# Patient Record
Sex: FEMALE | Race: OTHER | NOT HISPANIC OR LATINO | URBAN - METROPOLITAN AREA
[De-identification: names, ages, dates, MRNs, and addresses within clinical notes are randomized per-mention and may not be internally consistent; named-entity substitution may affect disease eponyms.]

---

## 2022-04-27 ENCOUNTER — EMERGENCY (EMERGENCY)
Facility: HOSPITAL | Age: 72
LOS: 1 days | Discharge: HOME | End: 2022-04-27
Attending: EMERGENCY MEDICINE | Admitting: EMERGENCY MEDICINE
Payer: MEDICARE

## 2022-04-27 VITALS
TEMPERATURE: 98 F | HEART RATE: 82 BPM | OXYGEN SATURATION: 98 % | DIASTOLIC BLOOD PRESSURE: 74 MMHG | HEIGHT: 65 IN | RESPIRATION RATE: 15 BRPM | SYSTOLIC BLOOD PRESSURE: 113 MMHG | WEIGHT: 130.07 LBS

## 2022-04-27 VITALS
RESPIRATION RATE: 16 BRPM | DIASTOLIC BLOOD PRESSURE: 72 MMHG | SYSTOLIC BLOOD PRESSURE: 120 MMHG | TEMPERATURE: 98 F | HEART RATE: 86 BPM | OXYGEN SATURATION: 98 %

## 2022-04-27 DIAGNOSIS — R10.9 UNSPECIFIED ABDOMINAL PAIN: ICD-10-CM

## 2022-04-27 DIAGNOSIS — N12 TUBULO-INTERSTITIAL NEPHRITIS, NOT SPECIFIED AS ACUTE OR CHRONIC: ICD-10-CM

## 2022-04-27 LAB
ALBUMIN SERPL ELPH-MCNC: 2.9 G/DL — LOW (ref 3.4–5)
ALBUMIN SERPL ELPH-MCNC: 3.2 G/DL — LOW (ref 3.4–5)
ALP SERPL-CCNC: 74 U/L — SIGNIFICANT CHANGE UP (ref 40–120)
ALP SERPL-CCNC: 76 U/L — SIGNIFICANT CHANGE UP (ref 40–120)
ALT FLD-CCNC: 47 U/L — HIGH (ref 12–42)
ALT FLD-CCNC: 49 U/L — HIGH (ref 12–42)
ANION GAP SERPL CALC-SCNC: 6 MMOL/L — LOW (ref 9–16)
ANION GAP SERPL CALC-SCNC: 9 MMOL/L — SIGNIFICANT CHANGE UP (ref 9–16)
APPEARANCE UR: CLEAR — SIGNIFICANT CHANGE UP
AST SERPL-CCNC: 40 U/L — HIGH (ref 15–37)
AST SERPL-CCNC: 58 U/L — HIGH (ref 15–37)
BACTERIA # UR AUTO: PRESENT /HPF
BASOPHILS # BLD AUTO: 0.05 K/UL — SIGNIFICANT CHANGE UP (ref 0–0.2)
BASOPHILS # BLD AUTO: 0.06 K/UL — SIGNIFICANT CHANGE UP (ref 0–0.2)
BASOPHILS NFR BLD AUTO: 0.4 % — SIGNIFICANT CHANGE UP (ref 0–2)
BASOPHILS NFR BLD AUTO: 0.4 % — SIGNIFICANT CHANGE UP (ref 0–2)
BILIRUB SERPL-MCNC: 1.2 MG/DL — SIGNIFICANT CHANGE UP (ref 0.2–1.2)
BILIRUB SERPL-MCNC: 1.4 MG/DL — HIGH (ref 0.2–1.2)
BILIRUB UR-MCNC: NEGATIVE — SIGNIFICANT CHANGE UP
BUN SERPL-MCNC: 20 MG/DL — SIGNIFICANT CHANGE UP (ref 7–23)
BUN SERPL-MCNC: 21 MG/DL — SIGNIFICANT CHANGE UP (ref 7–23)
CALCIUM SERPL-MCNC: 9.3 MG/DL — SIGNIFICANT CHANGE UP (ref 8.5–10.5)
CALCIUM SERPL-MCNC: 9.6 MG/DL — SIGNIFICANT CHANGE UP (ref 8.5–10.5)
CHLORIDE SERPL-SCNC: 103 MMOL/L — SIGNIFICANT CHANGE UP (ref 96–108)
CHLORIDE SERPL-SCNC: 104 MMOL/L — SIGNIFICANT CHANGE UP (ref 96–108)
CO2 SERPL-SCNC: 27 MMOL/L — SIGNIFICANT CHANGE UP (ref 22–31)
CO2 SERPL-SCNC: 28 MMOL/L — SIGNIFICANT CHANGE UP (ref 22–31)
COLOR SPEC: YELLOW — SIGNIFICANT CHANGE UP
CREAT SERPL-MCNC: 0.77 MG/DL — SIGNIFICANT CHANGE UP (ref 0.5–1.3)
CREAT SERPL-MCNC: 0.9 MG/DL — SIGNIFICANT CHANGE UP (ref 0.5–1.3)
DIFF PNL FLD: ABNORMAL
EGFR: 67 ML/MIN/1.73M2 — SIGNIFICANT CHANGE UP
EGFR: 81 ML/MIN/1.73M2 — SIGNIFICANT CHANGE UP
EOSINOPHIL # BLD AUTO: 0.02 K/UL — SIGNIFICANT CHANGE UP (ref 0–0.5)
EOSINOPHIL # BLD AUTO: 0.03 K/UL — SIGNIFICANT CHANGE UP (ref 0–0.5)
EOSINOPHIL NFR BLD AUTO: 0.1 % — SIGNIFICANT CHANGE UP (ref 0–6)
EOSINOPHIL NFR BLD AUTO: 0.2 % — SIGNIFICANT CHANGE UP (ref 0–6)
EPI CELLS # UR: ABNORMAL /HPF (ref 0–5)
GLUCOSE SERPL-MCNC: 115 MG/DL — HIGH (ref 70–99)
GLUCOSE SERPL-MCNC: 121 MG/DL — HIGH (ref 70–99)
GLUCOSE UR QL: NEGATIVE — SIGNIFICANT CHANGE UP
HCT VFR BLD CALC: 38 % — SIGNIFICANT CHANGE UP (ref 34.5–45)
HCT VFR BLD CALC: 39.4 % — SIGNIFICANT CHANGE UP (ref 34.5–45)
HGB BLD-MCNC: 12.9 G/DL — SIGNIFICANT CHANGE UP (ref 11.5–15.5)
HGB BLD-MCNC: 13.3 G/DL — SIGNIFICANT CHANGE UP (ref 11.5–15.5)
IMM GRANULOCYTES NFR BLD AUTO: 0.4 % — SIGNIFICANT CHANGE UP (ref 0–1.5)
IMM GRANULOCYTES NFR BLD AUTO: 0.4 % — SIGNIFICANT CHANGE UP (ref 0–1.5)
KETONES UR-MCNC: 15 MG/DL
LEUKOCYTE ESTERASE UR-ACNC: NEGATIVE — SIGNIFICANT CHANGE UP
LIDOCAIN IGE QN: 136 U/L — SIGNIFICANT CHANGE UP (ref 73–393)
LYMPHOCYTES # BLD AUTO: 1.06 K/UL — SIGNIFICANT CHANGE UP (ref 1–3.3)
LYMPHOCYTES # BLD AUTO: 1.28 K/UL — SIGNIFICANT CHANGE UP (ref 1–3.3)
LYMPHOCYTES # BLD AUTO: 7.7 % — LOW (ref 13–44)
LYMPHOCYTES # BLD AUTO: 9 % — LOW (ref 13–44)
MANUAL SMEAR VERIFICATION: SIGNIFICANT CHANGE UP
MCHC RBC-ENTMCNC: 31.5 PG — SIGNIFICANT CHANGE UP (ref 27–34)
MCHC RBC-ENTMCNC: 31.5 PG — SIGNIFICANT CHANGE UP (ref 27–34)
MCHC RBC-ENTMCNC: 33.8 GM/DL — SIGNIFICANT CHANGE UP (ref 32–36)
MCHC RBC-ENTMCNC: 33.9 GM/DL — SIGNIFICANT CHANGE UP (ref 32–36)
MCV RBC AUTO: 92.7 FL — SIGNIFICANT CHANGE UP (ref 80–100)
MCV RBC AUTO: 93.4 FL — SIGNIFICANT CHANGE UP (ref 80–100)
MONOCYTES # BLD AUTO: 2.15 K/UL — HIGH (ref 0–0.9)
MONOCYTES # BLD AUTO: 2.23 K/UL — HIGH (ref 0–0.9)
MONOCYTES NFR BLD AUTO: 15.6 % — HIGH (ref 2–14)
MONOCYTES NFR BLD AUTO: 15.7 % — HIGH (ref 2–14)
NEUTROPHILS # BLD AUTO: 10.38 K/UL — HIGH (ref 1.8–7.4)
NEUTROPHILS # BLD AUTO: 10.61 K/UL — HIGH (ref 1.8–7.4)
NEUTROPHILS NFR BLD AUTO: 74.4 % — SIGNIFICANT CHANGE UP (ref 43–77)
NEUTROPHILS NFR BLD AUTO: 75.7 % — SIGNIFICANT CHANGE UP (ref 43–77)
NITRITE UR-MCNC: POSITIVE
NRBC # BLD: 0 /100 WBCS — SIGNIFICANT CHANGE UP (ref 0–0)
NRBC # BLD: 0 /100 WBCS — SIGNIFICANT CHANGE UP (ref 0–0)
PH UR: 6 — SIGNIFICANT CHANGE UP (ref 5–8)
PLAT MORPH BLD: NORMAL — SIGNIFICANT CHANGE UP
PLATELET # BLD AUTO: 170 K/UL — SIGNIFICANT CHANGE UP (ref 150–400)
PLATELET # BLD AUTO: 193 K/UL — SIGNIFICANT CHANGE UP (ref 150–400)
POTASSIUM SERPL-MCNC: 4.5 MMOL/L — SIGNIFICANT CHANGE UP (ref 3.5–5.3)
POTASSIUM SERPL-MCNC: SIGNIFICANT CHANGE UP MMOL/L (ref 3.5–5.3)
POTASSIUM SERPL-SCNC: 4.5 MMOL/L — SIGNIFICANT CHANGE UP (ref 3.5–5.3)
POTASSIUM SERPL-SCNC: SIGNIFICANT CHANGE UP MMOL/L (ref 3.5–5.3)
PROT SERPL-MCNC: 8.2 G/DL — SIGNIFICANT CHANGE UP (ref 6.4–8.2)
PROT SERPL-MCNC: 8.4 G/DL — HIGH (ref 6.4–8.2)
PROT UR-MCNC: ABNORMAL MG/DL
RBC # BLD: 4.1 M/UL — SIGNIFICANT CHANGE UP (ref 3.8–5.2)
RBC # BLD: 4.22 M/UL — SIGNIFICANT CHANGE UP (ref 3.8–5.2)
RBC # FLD: 13.7 % — SIGNIFICANT CHANGE UP (ref 10.3–14.5)
RBC # FLD: 14 % — SIGNIFICANT CHANGE UP (ref 10.3–14.5)
RBC BLD AUTO: NORMAL — SIGNIFICANT CHANGE UP
RBC CASTS # UR COMP ASSIST: ABNORMAL /HPF
SODIUM SERPL-SCNC: 137 MMOL/L — SIGNIFICANT CHANGE UP (ref 132–145)
SODIUM SERPL-SCNC: 140 MMOL/L — SIGNIFICANT CHANGE UP (ref 132–145)
SP GR SPEC: <=1.005 — SIGNIFICANT CHANGE UP (ref 1–1.03)
UROBILINOGEN FLD QL: 2 E.U./DL
WBC # BLD: 13.72 K/UL — HIGH (ref 3.8–10.5)
WBC # BLD: 14.27 K/UL — HIGH (ref 3.8–10.5)
WBC # FLD AUTO: 13.72 K/UL — HIGH (ref 3.8–10.5)
WBC # FLD AUTO: 14.27 K/UL — HIGH (ref 3.8–10.5)
WBC UR QL: < 5 /HPF — SIGNIFICANT CHANGE UP

## 2022-04-27 PROCEDURE — 99285 EMERGENCY DEPT VISIT HI MDM: CPT

## 2022-04-27 PROCEDURE — 76705 ECHO EXAM OF ABDOMEN: CPT | Mod: 26

## 2022-04-27 PROCEDURE — 74177 CT ABD & PELVIS W/CONTRAST: CPT | Mod: 26

## 2022-04-27 RX ORDER — ONDANSETRON 8 MG/1
4 TABLET, FILM COATED ORAL ONCE
Refills: 0 | Status: COMPLETED | OUTPATIENT
Start: 2022-04-27 | End: 2022-04-27

## 2022-04-27 RX ORDER — CEFPODOXIME PROXETIL 100 MG
1 TABLET ORAL
Qty: 28 | Refills: 0
Start: 2022-04-27 | End: 2022-05-10

## 2022-04-27 RX ORDER — CEFUROXIME AXETIL 250 MG
1 TABLET ORAL
Qty: 28 | Refills: 0
Start: 2022-04-27 | End: 2022-05-10

## 2022-04-27 RX ORDER — OXYCODONE AND ACETAMINOPHEN 5; 325 MG/1; MG/1
2 TABLET ORAL ONCE
Refills: 0 | Status: DISCONTINUED | OUTPATIENT
Start: 2022-04-27 | End: 2022-04-27

## 2022-04-27 RX ORDER — CEFPODOXIME PROXETIL 100 MG
1 TABLET ORAL
Qty: 28 | Refills: 0
Start: 2022-04-27 | End: 2023-09-25

## 2022-04-27 RX ORDER — SODIUM CHLORIDE 9 MG/ML
1000 INJECTION INTRAMUSCULAR; INTRAVENOUS; SUBCUTANEOUS ONCE
Refills: 0 | Status: COMPLETED | OUTPATIENT
Start: 2022-04-27 | End: 2022-04-27

## 2022-04-27 RX ORDER — MORPHINE SULFATE 50 MG/1
4 CAPSULE, EXTENDED RELEASE ORAL ONCE
Refills: 0 | Status: DISCONTINUED | OUTPATIENT
Start: 2022-04-27 | End: 2022-04-27

## 2022-04-27 RX ORDER — CEFTRIAXONE 500 MG/1
1000 INJECTION, POWDER, FOR SOLUTION INTRAMUSCULAR; INTRAVENOUS ONCE
Refills: 0 | Status: COMPLETED | OUTPATIENT
Start: 2022-04-27 | End: 2022-04-27

## 2022-04-27 RX ADMIN — ONDANSETRON 4 MILLIGRAM(S): 8 TABLET, FILM COATED ORAL at 14:28

## 2022-04-27 RX ADMIN — OXYCODONE AND ACETAMINOPHEN 2 TABLET(S): 5; 325 TABLET ORAL at 20:24

## 2022-04-27 RX ADMIN — CEFTRIAXONE 100 MILLIGRAM(S): 500 INJECTION, POWDER, FOR SOLUTION INTRAMUSCULAR; INTRAVENOUS at 18:55

## 2022-04-27 RX ADMIN — SODIUM CHLORIDE 1000 MILLILITER(S): 9 INJECTION INTRAMUSCULAR; INTRAVENOUS; SUBCUTANEOUS at 14:28

## 2022-04-27 RX ADMIN — MORPHINE SULFATE 4 MILLIGRAM(S): 50 CAPSULE, EXTENDED RELEASE ORAL at 14:28

## 2022-04-27 NOTE — ED PROVIDER NOTE - CARE PROVIDER_API CALL
Todd Funk (DO)  [address], [facility] [place] [address]  Phone: [phone]  Fax: [phone]  Follow Up Time:

## 2022-04-27 NOTE — ED PROVIDER NOTE - NSFOLLOWUPINSTRUCTIONS_ED_ALL_ED_FT
Kidney Infection    WHAT YOU NEED TO KNOW:    A kidney infection, or pyelonephritis, is a bacterial infection. The infection usually starts in your bladder or urethra and moves into your kidney. One or both kidneys may be infected. Kidney, Ureters, Bladder         DISCHARGE INSTRUCTIONS:    Return to the emergency department if:     You have a fever and chills.       You cannot stop vomiting.      You have severe pain in your abdomen, lower back, or sides.    Contact your healthcare provider if:     You continue to have a fever after you take antibiotics for 3 days.      You have pain when you urinate, even after treatment.      Your signs and symptoms return.      You have questions or concerns about your condition or care.    Medicines: You may need any of the following:     Antibiotics treat your bacterial infection.       Acetaminophen decreases pain and fever. It is available without a doctor's order. Ask how much to take and how often to take it. Follow directions. Read the labels of all other medicines you are using to see if they also contain acetaminophen, or ask your doctor or pharmacist. Acetaminophen can cause liver damage if not taken correctly. Do not use more than 4 grams (4,000 milligrams) total of acetaminophen in one day.       NSAIDs, such as ibuprofen, help decrease swelling, pain, and fever. This medicine is available with or without a doctor's order. NSAIDs can cause stomach bleeding or kidney problems in certain people. If you take blood thinner medicine, always ask if NSAIDs are safe for you. Always read the medicine label and follow directions. Do not give these medicines to children under 6 months of age without direction from your child's healthcare provider.      Prescription pain medicine may be given. Ask how to take this medicine safely.      Take your medicine as directed. Contact your healthcare provider if you think your medicine is not helping or if you have side effects. Tell him of her if you are allergic to any medicine. Keep a list of the medicines, vitamins, and herbs you take. Include the amounts, and when and why you take them. Bring the list or the pill bottles to follow-up visits. Carry your medicine list with you in case of an emergency.    Drink liquids as directed: You may need to drink extra liquids to help flush your kidneys and urinary system. Water is the best liquid to drink. Ask your healthcare provider how much liquid to drink each day and which liquids are best for you.    Urinate as soon as you feel the urge: This will help flush bacteria from your urinary system. Do not wait or hold your urine for too long.     Clean your genital area every day with soap and water: Wipe from front to back after you urinate or have a bowel movement. Wear cotton underwear. Fabrics such as nylon and polyester can stay damp. This can increase your risk for infection. Urinate within 15 minutes after you have sex.    Follow up with your healthcare provider as directed: Write down your questions so you remember to ask them during your visits.

## 2022-04-27 NOTE — ED PROVIDER NOTE - PHYSICAL EXAMINATION
VSS in NAD non toxic appearing   NCAT EOMI PERRL OP clear  heart RRR no murmur   lungs CTA no wheezing no rales no rhonchi   abd soft (+) L abd/epigastric tenderness ND no guarding no rebound   normal neuro exam CN I-XII grossly intact, no groos motor or sensory deficits  no peripheral c/c/e

## 2022-04-27 NOTE — ED ADULT NURSE NOTE - NSIMPLEMENTINTERV_GEN_ALL_ED
Implemented All Universal Safety Interventions:  [place] to call system. Call bell, personal items and telephone within reach. Instruct patient to call for assistance. Room bathroom lighting operational. Non-slip footwear when patient is off stretcher. Physically safe environment: no spills, clutter or unnecessary equipment. Stretcher in lowest position, wheels locked, appropriate side rails in place.

## 2022-04-27 NOTE — ED PROVIDER NOTE - CLINICAL SUMMARY MEDICAL DECISION MAKING FREE TEXT BOX
pyelonephritis on CT, IV abx started in ED< toelrated PO, pain controlled, stable for dc home pyelonephritis on CT, IV abx started in ED, tolerated PO, pain controlled, stable for dc home

## 2022-04-27 NOTE — ED ADULT TRIAGE NOTE - CHIEF COMPLAINT QUOTE
patient BIBA from home c/o flu-like s/s and left side abd pain since Sunday; body aches, chills today; covid positive april 1st and just had pfizer booster april 14th;

## 2022-04-27 NOTE — ED PROVIDER NOTE - OBJECTIVE STATEMENT
73 yo female with c/o URI sx for a few days, L sided flank pain, constant, dull aching, for 2-3 days, (+) subjective fever, no chills, no vomiting.

## 2022-04-27 NOTE — ED PROVIDER NOTE - PATIENT PORTAL LINK FT
You can access the FollowMyHealth Patient Portal offered by [facility] by registering at the following website: [email or web address]. By joining [employer]’s FollowMyHealth portal, you will also be able to view your health information using other applications (apps) compatible with our system.

## 2022-04-29 LAB
CULTURE RESULTS: SIGNIFICANT CHANGE UP
SPECIMEN SOURCE: SIGNIFICANT CHANGE UP

## 2022-10-28 PROBLEM — Z00.00 ENCOUNTER FOR PREVENTIVE HEALTH EXAMINATION: Status: ACTIVE | Noted: 2022-10-28

## 2022-10-31 ENCOUNTER — RESULT REVIEW (OUTPATIENT)
Age: 72
End: 2022-10-31

## 2022-10-31 ENCOUNTER — APPOINTMENT (OUTPATIENT)
Dept: ORTHOPEDIC SURGERY | Facility: CLINIC | Age: 72
End: 2022-10-31

## 2022-10-31 ENCOUNTER — OUTPATIENT (OUTPATIENT)
Dept: OUTPATIENT SERVICES | Facility: HOSPITAL | Age: 72
LOS: 1 days | End: 2022-10-31
Payer: MEDICARE

## 2022-10-31 VITALS
DIASTOLIC BLOOD PRESSURE: 79 MMHG | SYSTOLIC BLOOD PRESSURE: 136 MMHG | OXYGEN SATURATION: 99 % | HEART RATE: 56 BPM | HEIGHT: 64 IN | TEMPERATURE: 98 F | BODY MASS INDEX: 25.1 KG/M2 | WEIGHT: 147 LBS

## 2022-10-31 DIAGNOSIS — Z60.2 PROBLEMS RELATED TO LIVING ALONE: ICD-10-CM

## 2022-10-31 DIAGNOSIS — Z85.3 PERSONAL HISTORY OF MALIGNANT NEOPLASM OF BREAST: ICD-10-CM

## 2022-10-31 DIAGNOSIS — M16.0 BILATERAL PRIMARY OSTEOARTHRITIS OF HIP: ICD-10-CM

## 2022-10-31 DIAGNOSIS — Z78.9 OTHER SPECIFIED HEALTH STATUS: ICD-10-CM

## 2022-10-31 PROCEDURE — 73502 X-RAY EXAM HIP UNI 2-3 VIEWS: CPT

## 2022-10-31 PROCEDURE — 99203 OFFICE O/P NEW LOW 30 MIN: CPT

## 2022-10-31 PROCEDURE — 73502 X-RAY EXAM HIP UNI 2-3 VIEWS: CPT | Mod: 26,LT

## 2022-10-31 RX ORDER — MELOXICAM 7.5 MG/1
7.5 TABLET ORAL DAILY
Qty: 30 | Refills: 0 | Status: ACTIVE | COMMUNITY
Start: 2022-10-31 | End: 1900-01-01

## 2022-10-31 RX ORDER — EXEMESTANE 25 MG/1
TABLET, FILM COATED ORAL
Refills: 0 | Status: ACTIVE | COMMUNITY

## 2022-10-31 SDOH — SOCIAL STABILITY - SOCIAL INSECURITY: PROBLEMS RELATED TO LIVING ALONE: Z60.2

## 2022-10-31 NOTE — PHYSICAL EXAM
[de-identified] : Exam she is alert and oriented.  She is able to stand on each leg without a positive Trendelenburg.  There is some mild antalgia on the left side.  She has some mild pain posteriorly with range of motion and with ADA testing.  Straight leg testing and contralateral straight leg testing also causes some discomfort abdominally posteriorly but no radiation past the knee.  Ankle dorsiflexion plantarflexion foot eversion foot inversion 5 out of 5.  L3-S1 sensation light touch intact although again she subjectively reports some decrease over the anterior aspect of the knee area.  DP/PT 2+ [de-identified] : Imaging studies reviewed include 3 views of the left hip.  I think there is some mild arthritis but no bone-on-bone apposition.  MRI reports of the femur and hip reveal some degenerative changes in the hip with some mild hamstring and gluteus tendinosis.  In addition MRI studies do show significant degenerative change of the spine by report (we were not able to open the images with this she brought).

## 2022-10-31 NOTE — HISTORY OF PRESENT ILLNESS
[de-identified] : Patient is here for left-sided pain.  She is pain in her low back and buttock area that does radiate laterally sometimes down the leg.  She is noted some numbness over the anterior aspect of her knee at times as well.  Not complete numbness but she feels a subjective decreased sensation.  No specific trauma or injury.  She is try to work with chiropractor.  She takes Tylenol and medical marijuana.  She had multiple MRI studies and is here to and regarding that.  Is making it difficult for her to walk and she finds very bad in the morning.  It does get better throughout the day but it makes work challenging for her.

## 2022-10-31 NOTE — PHYSICAL EXAM
[de-identified] : Exam she is alert and oriented.  She is able to stand on each leg without a positive Trendelenburg.  There is some mild antalgia on the left side.  She has some mild pain posteriorly with range of motion and with ADA testing.  Straight leg testing and contralateral straight leg testing also causes some discomfort abdominally posteriorly but no radiation past the knee.  Ankle dorsiflexion plantarflexion foot eversion foot inversion 5 out of 5.  L3-S1 sensation light touch intact although again she subjectively reports some decrease over the anterior aspect of the knee area.  DP/PT 2+ [de-identified] : Imaging studies reviewed include 3 views of the left hip.  I think there is some mild arthritis but no bone-on-bone apposition.  MRI reports of the femur and hip reveal some degenerative changes in the hip with some mild hamstring and gluteus tendinosis.  In addition MRI studies do show significant degenerative change of the spine by report (we were not able to open the images with this she brought).

## 2022-10-31 NOTE — HISTORY OF PRESENT ILLNESS
[de-identified] : Patient is here for left-sided pain.  She is pain in her low back and buttock area that does radiate laterally sometimes down the leg.  She is noted some numbness over the anterior aspect of her knee at times as well.  Not complete numbness but she feels a subjective decreased sensation.  No specific trauma or injury.  She is try to work with chiropractor.  She takes Tylenol and medical marijuana.  She had multiple MRI studies and is here to and regarding that.  Is making it difficult for her to walk and she finds very bad in the morning.  It does get better throughout the day but it makes work challenging for her.

## 2022-10-31 NOTE — DISCUSSION/SUMMARY
[de-identified] : Left leg and back pain is noted.  Patient does have some mild arthritis and degenerative changes of the hip and we discussed that.  We talked about symptomatic treatments she can use.  We discussed the spectrum of symptomatic treatments. Our discussion included the use of appropriate exercises, activity modifications, weight reduction and maintenance, appropriate medication use, use of assistive devices,and avoidance of high impact activities.  Discussed the risks of GI bleeding and other side effects and decided we will try an anti-inflammatory to add to her medication regimen.  Although I think the hip arthritis may be partially contributing I do not think is the major contributor of her symptoms we discussed that.  I think the spine is more likely because I made a referral and provided contact information for spine evaluation.  She will continue work on nonoperative modalities and will follow-up with is any significant changes or concerns but I think the neck step is a spine evaluation she agrees.  All questions answered.

## 2022-10-31 NOTE — DISCUSSION/SUMMARY
[de-identified] : Left leg and back pain is noted.  Patient does have some mild arthritis and degenerative changes of the hip and we discussed that.  We talked about symptomatic treatments she can use.  We discussed the spectrum of symptomatic treatments. Our discussion included the use of appropriate exercises, activity modifications, weight reduction and maintenance, appropriate medication use, use of assistive devices,and avoidance of high impact activities.  Discussed the risks of GI bleeding and other side effects and decided we will try an anti-inflammatory to add to her medication regimen.  Although I think the hip arthritis may be partially contributing I do not think is the major contributor of her symptoms we discussed that.  I think the spine is more likely because I made a referral and provided contact information for spine evaluation.  She will continue work on nonoperative modalities and will follow-up with is any significant changes or concerns but I think the neck step is a spine evaluation she agrees.  All questions answered.

## 2022-11-01 ENCOUNTER — APPOINTMENT (OUTPATIENT)
Dept: ORTHOPEDIC SURGERY | Facility: CLINIC | Age: 72
End: 2022-11-01

## 2022-11-01 DIAGNOSIS — Z91.89 OTHER SPECIFIED PERSONAL RISK FACTORS, NOT ELSEWHERE CLASSIFIED: ICD-10-CM

## 2022-11-01 PROCEDURE — 72083 X-RAY EXAM ENTIRE SPI 4/5 VW: CPT

## 2022-11-01 PROCEDURE — 99213 OFFICE O/P EST LOW 20 MIN: CPT

## 2022-11-04 NOTE — END OF VISIT
[FreeTextEntry3] : All medical record entries made by the Scribe were at my, Dr. Luke Yepez, direction and personally dictated by me on 11/01/2022 . I have reviewed the chart and agree that the record accurately reflects my personal performance of the history, physical exam, assessment and plan. I have also personally directed, reviewed, and agreed with the chart.

## 2022-11-04 NOTE — HISTORY OF PRESENT ILLNESS
[de-identified] : Patient was referred by Dr. Leal. She reports low back pain since January 2022, atraumatic onset. Pain radiates down her left lower extremity anteriorly to her foot. She states her pain is approximately 50 perfect low back and 50 percent left lower extremity. She also occasionally has pain in the same distribution down her right lower extremity. She denies any lower extremity numbness, weakness, or paresthesias. She is significantly limited by current symptoms. She has used a TENS unit without any relief and she has also done acupuncture without relief.

## 2022-11-04 NOTE — PHYSICAL EXAM
[de-identified] : Physical Exam:\par \par General: patient is well developed, well nourished, in no acute\par distress, alert and oriented x 3.\par \par Mood and affect: normal\par \par Respiratory: no respiratory distress noted\par \par Skin: no scars over spine, skin intact, no erythema, increased warmth\par \par Alignment: The spine is well compensated in the coronal and sagittal plane.\par \par Gait: The patient is able to toe walk and heel walk without difficulty. The patient is able to tandem gait without difficulty.\par \par Palpation: Tender to palpation over her left greater trochanter.\par \par Range of motion: Lumbar spine ROM is restricted.\par \par Neurologic Exam:\par Motor: Manual Muscle testing in the lower extremities is 5 out of 5 in all muscle groups. There is no evidence of muscular\par atrophy in the lower extremities. Sensory: Sensation to light touch is grossly intact in the lower extremities\par \par Reflexes: DTR are present and symmetric throughout, no clonus, plantar responses are flexor\par \par Hip Exam: No pain with internal or external rotation of hips bilaterally\par \par Special tests: Straight leg raise test negative. Cross straight leg test negative. ADA test negative\par \par Vascular: Examination of the peripheral vascular system demonstrates no evidence of congestion or edema. no evidence of\par lymphedema bilateral lower extremities, pulses are present and symmetric in both lower extremities. [de-identified] : XR Full Spine AP/Lateral with Lumbar flexion/extension 11/01/2022 [my read]: Lumbar scoliosis. There is lateral listhesis that is significant at L3/4. There is concavity of her scoliosis with n apex at L2/3 and L3/4 on the left. She has a fractional curve with a concavity on the right at L4/5 and L5/S1. There is multilevel disc degeneration. Her sagittal and coronal global alignment are well maintained. There is no instability seen on flexion/extension. There are no fractures seen.

## 2022-11-04 NOTE — DISCUSSION/SUMMARY
[de-identified] : Diagnosis: Lumbar radiculopathy, lumbar degenerative scoliosis, lateral listhesis L3/4, osteoporosis.\par \par I have discussed my findings with the patient. I was unable to review her MRIs as the report only was available today. Given her symptoms which have been progressing, I would recommend a new MRI Lumbar Spine with and without contrast. Additionally, given her prior history of stress fracture in the right sacrum, I do think that it would be useful to re image it now 7 months later to assess for healing of her stress fracture. I have also ordered a DEXA scan to assess for bone density. Patient will follow up with me after testing at which point we will review and develop a treatment plan.

## 2022-11-14 ENCOUNTER — APPOINTMENT (OUTPATIENT)
Dept: ORTHOPEDIC SURGERY | Facility: CLINIC | Age: 72
End: 2022-11-14
Payer: MEDICARE

## 2022-11-14 PROCEDURE — 99214 OFFICE O/P EST MOD 30 MIN: CPT

## 2022-11-28 ENCOUNTER — APPOINTMENT (OUTPATIENT)
Dept: PHYSICAL MEDICINE AND REHAB | Facility: CLINIC | Age: 72
End: 2022-11-28

## 2022-12-20 NOTE — ED ADULT NURSE NOTE - NS ED NOTE ABUSE RESPONSE YN
No new care gaps identified.  [facility] Embedded Care Gaps. Reference number: [id number]. 12/20/2022   3:55:44 AM CST   Yes

## 2023-01-05 NOTE — PHYSICAL EXAM
[de-identified] : Physical Exam:\par \par General: patient is well developed, well nourished, in no acute\par distress, alert and oriented x 3.\par \par Mood and affect: normal\par \par Respiratory: no respiratory distress noted\par \par Skin: no scars over spine, skin intact, no erythema, increased warmth\par \par Alignment: The spine is well compensated in the coronal and sagittal plane.\par \par Gait: The patient is able to toe walk and heel walk without difficulty. The patient is able to tandem gait without difficulty.\par \par Palpation: Tender to palpation over her left greater trochanter.\par \par Range of motion: Lumbar spine ROM is restricted.\par \par Neurologic Exam:\par Motor: Manual Muscle testing in the lower extremities is 5 out of 5 in all muscle groups. There is no evidence of muscular\par atrophy in the lower extremities. Sensory: Sensation to light touch is grossly intact in the lower extremities\par \par Reflexes: DTR are present and symmetric throughout, no clonus, plantar responses are flexor\par \par Hip Exam: No pain with internal or external rotation of hips bilaterally\par \par Special tests: Straight leg raise test negative. Cross straight leg test negative. ADA test negative\par \par Vascular: Examination of the peripheral vascular system demonstrates no evidence of congestion or edema. no evidence of\par lymphedema bilateral lower extremities, pulses are present and symmetric in both lower extremities. [de-identified] : MRI Lumbar Spine noncontrast reviewed 11/07/2022: Patient has significant rotatory scoliosis. There is a lateral listhesis seen at L3/4. There is severe neuroforaminal stenosis on the left at L2/3 and L3/4. There is lateral recess stenosis at L3/4 on the left. The patient has moderate to severe right sided foraminal stenosis at L4/5 and L5/S1.\par \par XR Full Spine AP/Lateral with Lumbar flexion/extension 11/01/2022 [my read]: Lumbar scoliosis. There is lateral listhesis that is significant at L3/4. There is concavity of her scoliosis with n apex at L2/3 and L3/4 on the left. She has a fractional curve with a concavity on the right at L4/5 and L5/S1. There is multilevel disc degeneration. Her sagittal and coronal global alignment are well maintained. There is no instability seen on flexion/extension. There are no fractures seen.

## 2023-01-05 NOTE — DISCUSSION/SUMMARY
[de-identified] : Diagnosis: Lumbar radiculopathy, rotatory scoliosis, L3/4 lateral spondylolisthesis, spinal stenosis.\par \par I have discussed my findings with the patient. I have recommended a course of physical therapy. I have also given a referral to Dr. Niels Urbina for left sided L3/4 DOMINIC. The patient was given a referral to an endocrinologist to address her bone health and osteoporosis. She should follow up with me in 2 months for follow up and clinical review.

## 2023-01-05 NOTE — END OF VISIT
[FreeTextEntry3] : All medical record entries made by the Scribe were at my, Dr. Luke Yepez, direction and personally dictated by me on 11/14/2022. I have reviewed the chart and agree that the record accurately reflects my personal performance of the history, physical exam, assessment and plan. I have also personally directed, reviewed, and agreed with the chart.

## 2023-01-05 NOTE — HISTORY OF PRESENT ILLNESS
[de-identified] : Follow up 11/14/2022: Patient continues to have pain in her low back that radiates into her left anterior thigh mainly. This has been unchanged since her last visit. In the interim, she got a MRI of her Lumbar Spine.\par \par Initial visit 11/01/2022: Patient was referred by Dr. Leal. She reports low back pain since January 2022, atraumatic onset. Pain radiates down her left lower extremity anteriorly to her foot. She states her pain is approximately 50 perfect low back and 50 percent left lower extremity. She also occasionally has pain in the same distribution down her right lower extremity. She denies any lower extremity numbness, weakness, or paresthesias. She is significantly limited by current symptoms. She has used a TENS unit without any relief and she has also done acupuncture without relief.

## 2023-01-10 ENCOUNTER — APPOINTMENT (OUTPATIENT)
Dept: ORTHOPEDIC SURGERY | Facility: CLINIC | Age: 73
End: 2023-01-10

## 2023-04-06 ENCOUNTER — EMERGENCY (EMERGENCY)
Facility: HOSPITAL | Age: 73
LOS: 1 days | Discharge: HOME | End: 2023-04-06
Admitting: EMERGENCY MEDICINE
Payer: MEDICARE

## 2023-04-06 VITALS
HEART RATE: 63 BPM | WEIGHT: 145.06 LBS | DIASTOLIC BLOOD PRESSURE: 69 MMHG | HEIGHT: 63 IN | SYSTOLIC BLOOD PRESSURE: 161 MMHG | OXYGEN SATURATION: 100 % | TEMPERATURE: 98 F | RESPIRATION RATE: 16 BRPM

## 2023-04-06 DIAGNOSIS — R07.89 OTHER CHEST PAIN: ICD-10-CM

## 2023-04-06 DIAGNOSIS — N64.4 MASTODYNIA: ICD-10-CM

## 2023-04-06 DIAGNOSIS — Z20.822 CONTACT WITH AND (SUSPECTED) EXPOSURE TO COVID-19: ICD-10-CM

## 2023-04-06 DIAGNOSIS — Z85.3 PERSONAL HISTORY OF MALIGNANT NEOPLASM OF BREAST: ICD-10-CM

## 2023-04-06 LAB
ALBUMIN SERPL ELPH-MCNC: 3.7 G/DL — SIGNIFICANT CHANGE UP (ref 3.4–5)
ALP SERPL-CCNC: 53 U/L — SIGNIFICANT CHANGE UP (ref 40–120)
ALT FLD-CCNC: 23 U/L — SIGNIFICANT CHANGE UP (ref 12–42)
ANION GAP SERPL CALC-SCNC: 7 MMOL/L — LOW (ref 9–16)
APPEARANCE UR: CLEAR — SIGNIFICANT CHANGE UP
APTT BLD: 30.8 SEC — SIGNIFICANT CHANGE UP (ref 27.5–35.5)
AST SERPL-CCNC: 24 U/L — SIGNIFICANT CHANGE UP (ref 15–37)
BACTERIA # UR AUTO: PRESENT /HPF
BASOPHILS # BLD AUTO: 0.03 K/UL — SIGNIFICANT CHANGE UP (ref 0–0.2)
BASOPHILS NFR BLD AUTO: 0.6 % — SIGNIFICANT CHANGE UP (ref 0–2)
BILIRUB SERPL-MCNC: 0.4 MG/DL — SIGNIFICANT CHANGE UP (ref 0.2–1.2)
BILIRUB UR-MCNC: NEGATIVE — SIGNIFICANT CHANGE UP
BUN SERPL-MCNC: 13 MG/DL — SIGNIFICANT CHANGE UP (ref 7–23)
CALCIUM SERPL-MCNC: 9.2 MG/DL — SIGNIFICANT CHANGE UP (ref 8.5–10.5)
CHLORIDE SERPL-SCNC: 105 MMOL/L — SIGNIFICANT CHANGE UP (ref 96–108)
CO2 SERPL-SCNC: 28 MMOL/L — SIGNIFICANT CHANGE UP (ref 22–31)
COLOR SPEC: YELLOW — SIGNIFICANT CHANGE UP
COMMENT - URINE: SIGNIFICANT CHANGE UP
CREAT SERPL-MCNC: 0.83 MG/DL — SIGNIFICANT CHANGE UP (ref 0.5–1.3)
DIFF PNL FLD: NEGATIVE — SIGNIFICANT CHANGE UP
EGFR: 73 ML/MIN/1.73M2 — SIGNIFICANT CHANGE UP
EOSINOPHIL # BLD AUTO: 0.16 K/UL — SIGNIFICANT CHANGE UP (ref 0–0.5)
EOSINOPHIL NFR BLD AUTO: 3.3 % — SIGNIFICANT CHANGE UP (ref 0–6)
EPI CELLS # UR: ABNORMAL /HPF (ref 0–5)
GLUCOSE SERPL-MCNC: 92 MG/DL — SIGNIFICANT CHANGE UP (ref 70–99)
GLUCOSE UR QL: NEGATIVE — SIGNIFICANT CHANGE UP
HCT VFR BLD CALC: 38.5 % — SIGNIFICANT CHANGE UP (ref 34.5–45)
HGB BLD-MCNC: 12.9 G/DL — SIGNIFICANT CHANGE UP (ref 11.5–15.5)
IMM GRANULOCYTES NFR BLD AUTO: 0.2 % — SIGNIFICANT CHANGE UP (ref 0–0.9)
INR BLD: 1.1 — SIGNIFICANT CHANGE UP (ref 0.88–1.16)
KETONES UR-MCNC: NEGATIVE — SIGNIFICANT CHANGE UP
LEUKOCYTE ESTERASE UR-ACNC: ABNORMAL
LYMPHOCYTES # BLD AUTO: 1.13 K/UL — SIGNIFICANT CHANGE UP (ref 1–3.3)
LYMPHOCYTES # BLD AUTO: 23 % — SIGNIFICANT CHANGE UP (ref 13–44)
MAGNESIUM SERPL-MCNC: 2 MG/DL — SIGNIFICANT CHANGE UP (ref 1.6–2.6)
MCHC RBC-ENTMCNC: 31.5 PG — SIGNIFICANT CHANGE UP (ref 27–34)
MCHC RBC-ENTMCNC: 33.5 GM/DL — SIGNIFICANT CHANGE UP (ref 32–36)
MCV RBC AUTO: 94.1 FL — SIGNIFICANT CHANGE UP (ref 80–100)
MONOCYTES # BLD AUTO: 0.52 K/UL — SIGNIFICANT CHANGE UP (ref 0–0.9)
MONOCYTES NFR BLD AUTO: 10.6 % — SIGNIFICANT CHANGE UP (ref 2–14)
NEUTROPHILS # BLD AUTO: 3.07 K/UL — SIGNIFICANT CHANGE UP (ref 1.8–7.4)
NEUTROPHILS NFR BLD AUTO: 62.3 % — SIGNIFICANT CHANGE UP (ref 43–77)
NITRITE UR-MCNC: NEGATIVE — SIGNIFICANT CHANGE UP
NRBC # BLD: 0 /100 WBCS — SIGNIFICANT CHANGE UP (ref 0–0)
NT-PROBNP SERPL-SCNC: 172 PG/ML — SIGNIFICANT CHANGE UP
PH UR: 6 — SIGNIFICANT CHANGE UP (ref 5–8)
PLATELET # BLD AUTO: 243 K/UL — SIGNIFICANT CHANGE UP (ref 150–400)
POTASSIUM SERPL-MCNC: 4.1 MMOL/L — SIGNIFICANT CHANGE UP (ref 3.5–5.3)
POTASSIUM SERPL-SCNC: 4.1 MMOL/L — SIGNIFICANT CHANGE UP (ref 3.5–5.3)
PROT SERPL-MCNC: 8.1 G/DL — SIGNIFICANT CHANGE UP (ref 6.4–8.2)
PROT UR-MCNC: NEGATIVE MG/DL — SIGNIFICANT CHANGE UP
PROTHROM AB SERPL-ACNC: 12.9 SEC — SIGNIFICANT CHANGE UP (ref 10.5–13.4)
RBC # BLD: 4.09 M/UL — SIGNIFICANT CHANGE UP (ref 3.8–5.2)
RBC # FLD: 14 % — SIGNIFICANT CHANGE UP (ref 10.3–14.5)
RBC CASTS # UR COMP ASSIST: ABNORMAL /HPF
SARS-COV-2 RNA SPEC QL NAA+PROBE: SIGNIFICANT CHANGE UP
SODIUM SERPL-SCNC: 140 MMOL/L — SIGNIFICANT CHANGE UP (ref 132–145)
SP GR SPEC: 1.02 — SIGNIFICANT CHANGE UP (ref 1–1.03)
TROPONIN I, HIGH SENSITIVITY RESULT: 8 NG/L — SIGNIFICANT CHANGE UP
UROBILINOGEN FLD QL: 0.2 E.U./DL — SIGNIFICANT CHANGE UP
WBC # BLD: 4.92 K/UL — SIGNIFICANT CHANGE UP (ref 3.8–10.5)
WBC # FLD AUTO: 4.92 K/UL — SIGNIFICANT CHANGE UP (ref 3.8–10.5)
WBC UR QL: > 10 /HPF

## 2023-04-06 PROCEDURE — 99284 EMERGENCY DEPT VISIT MOD MDM: CPT

## 2023-04-06 NOTE — ED ADULT NURSE NOTE - OBJECTIVE STATEMENT
Pt is a 74 y/o F c/o chest pain. Patient sent by  due to abnormal EKG. Patient reports chest pain has been occurring for 3 days and it is in the same area of recent breast surgery. Pt has pmhx of left breast cancer. Pt denies chest pain radiating towards arm or face. Reports only nausea with no vomitting. Pt placed on continuous cardiac monitor.

## 2023-04-06 NOTE — ED ADULT NURSE NOTE - CHIEF COMPLAINT QUOTE
Pt. walk in from  for chest pain and EKG changes (inverted T wave in lead 3, T wave flattening in AVF). C/o L. sided CP under breast, nausea, and fatigue. Denies SOB, vomiting, and dizziness.

## 2023-04-07 VITALS
HEART RATE: 70 BPM | SYSTOLIC BLOOD PRESSURE: 155 MMHG | TEMPERATURE: 98 F | RESPIRATION RATE: 17 BRPM | DIASTOLIC BLOOD PRESSURE: 75 MMHG | OXYGEN SATURATION: 97 %

## 2023-04-07 LAB — TROPONIN I, HIGH SENSITIVITY RESULT: 9.5 NG/L — SIGNIFICANT CHANGE UP

## 2023-04-07 PROCEDURE — 71045 X-RAY EXAM CHEST 1 VIEW: CPT | Mod: 26

## 2023-04-07 RX ORDER — KETOROLAC TROMETHAMINE 30 MG/ML
30 SYRINGE (ML) INJECTION ONCE
Refills: 0 | Status: DISCONTINUED | OUTPATIENT
Start: 2023-04-07 | End: 2023-04-07

## 2023-04-07 RX ADMIN — Medication 30 MILLIGRAM(S): at 00:48

## 2023-04-07 NOTE — ED PROVIDER NOTE - OBJECTIVE STATEMENT
74 y/o female hx of breast CA here with left sided chest and breast pain worse with palpation for several days waxing and waning. Pain is nonexertional. Patient appears well NAD stable. Denies fever, chills, hematuria, vaginal bleeding, d/c, abdominal pain, change in bowel function, flank pain, rash, HA, dizziness, SOB, palpitations, diaphoresis, cough, and malaise.

## 2023-04-07 NOTE — ED ADULT NURSE REASSESSMENT NOTE - NS ED NURSE REASSESS COMMENT FT1
Pt. received AAOx3 semi fowlers in stretcher breathing at ease on room air in no apparent distress. 22g to RAC no redness, swelling, or tenderness noted. Pt. reports that pain has not improved with medication, provider aware, awaiting further orders. Pending dispo.

## 2023-04-07 NOTE — ED PROVIDER NOTE - SKIN, MLM
----- Message from Savana Forte MD sent at 12/28/2022  8:54 AM CST -----  Please call the pt.with results.  CBC normal.  CMP stable.  
Phone call to patient advised on results. No questions at this time.  
Skin normal color for race, warm, dry and intact. No evidence of rash.

## 2023-04-07 NOTE — ED PROVIDER NOTE - CLINICAL SUMMARY MEDICAL DECISION MAKING FREE TEXT BOX
Patient here with intermittent chest discomfort sent in by UC  Exam unremarkable   ekg nonischemic   trops neg nx 2   d/c with return precautions

## 2023-04-07 NOTE — ED PROVIDER NOTE - PATIENT PORTAL LINK FT
You can access the FollowMyHealth Patient Portal offered by [facility] by registering at the following website: [email or web address]. By joining [employer]’s FollowMyHealth portal, you will also be able to view your health information using other applications (apps) compatible with our system.

## 2023-04-10 RX ORDER — CEFPODOXIME PROXETIL 100 MG
1 TABLET ORAL
Qty: 14 | Refills: 0
Start: 2023-04-10 | End: 2023-04-16

## 2023-04-10 RX ORDER — ACETYLCYSTEINE 200 MG/ML
2 VIAL (ML) MISCELLANEOUS
Qty: 120 | Refills: 0
Start: 2023-04-10 | End: 2023-05-09

## 2023-04-10 RX ORDER — L.ACIDOPH/B.ANIMALIS/B.LONGUM 15B CELL
1 CAPSULE ORAL
Qty: 30 | Refills: 0
Start: 2023-04-10 | End: 2023-05-09

## 2023-04-10 NOTE — ED POST DISCHARGE NOTE - ADDITIONAL DOCUMENTATION
Cefpodoxime sent, she will  a copy of cx today too,. patient also was dx'd with shingles- noticed a rash on her back and is now on Valtrex, NAC also sent to help boost GST for shingles.

## 2023-08-14 PROBLEM — Z78.9 OTHER SPECIFIED HEALTH STATUS: Chronic | Status: ACTIVE | Noted: 2023-04-07

## 2023-08-22 ENCOUNTER — APPOINTMENT (OUTPATIENT)
Dept: ORTHOPEDIC SURGERY | Facility: CLINIC | Age: 73
End: 2023-08-22
Payer: MEDICARE

## 2023-08-22 DIAGNOSIS — M81.0 AGE-RELATED OSTEOPOROSIS W/OUT CURRENT PATHOLOGICAL FRACTURE: ICD-10-CM

## 2023-08-22 DIAGNOSIS — M41.50 OTHER SECONDARY SCOLIOSIS, SITE UNSPECIFIED: ICD-10-CM

## 2023-08-22 DIAGNOSIS — M54.16 RADICULOPATHY, LUMBAR REGION: ICD-10-CM

## 2023-08-22 PROCEDURE — 99214 OFFICE O/P EST MOD 30 MIN: CPT

## 2023-08-22 PROCEDURE — 72083 X-RAY EXAM ENTIRE SPI 4/5 VW: CPT

## 2023-08-22 NOTE — DISCUSSION/SUMMARY
[de-identified] : Scoliosis; lateral listhesis L3/4; disc degeneration; stenosis; lumbar radiculopathy discussed my findings with the patient surgically would require T10 to pelvis fixation/fusion with interbody support, laminectomies, osteotomies, T7-9 tethers, spinal navigation.  discussed risks benefits alternatives.  Patient deciding whether she wishes to proceed and will contact our office for follow up if wishes to proceed.  would need DEXA and CT scans.  all questions answered also has left trochanteric buristis and sent to dr. arellano for left ultrasound guided GTB injection.

## 2023-08-22 NOTE — HISTORY OF PRESENT ILLNESS
[de-identified] : Follow up 8/22/23: conitnues to have pain in her lower back that radiates into the left anterior thigh.  feels left knee is numb and has episodes where the left leg edilberto when walking.  had new mri at [facility].  Follow up 11/14/2022: Patient continues to have pain in her low back that radiates into her left anterior thigh mainly. This has been unchanged since her last visit. In the interim, she got a MRI of her Lumbar Spine.  Initial visit 11/01/2022: Patient was referred by Dr. Leal. She reports low back pain since January 2022, atraumatic onset. Pain radiates down her left lower extremity anteriorly to her foot. She states her pain is approximately 50 perfect low back and 50 percent left lower extremity. She also occasionally has pain in the same distribution down her right lower extremity. She denies any lower extremity numbness, weakness, or paresthesias. She is significantly limited by current symptoms. She has used a TENS unit without any relief and she has also done acupuncture without relief.

## 2023-08-22 NOTE — PHYSICAL EXAM
[de-identified] : Physical Exam:    General: patient is well developed, well nourished, in no acute  distress, alert and oriented x 3.    Mood and affect: normal    Respiratory: no respiratory distress noted    Skin: no scars over spine, skin intact, no erythema, increased warmth    Alignment: The spine is well compensated in the coronal and sagittal plane.    Gait: The patient is able to toe walk and heel walk without difficulty. The patient is able to tandem gait without difficulty.    Palpation: Tender to palpation over her left greater trochanter.    Range of motion: Lumbar spine ROM is restricted.    Neurologic Exam:  Motor: Manual Muscle testing in the lower extremities is 5 out of 5 in all muscle groups. There is no evidence of muscular  atrophy in the lower extremities. Sensory: Sensation to light touch is grossly intact in the lower extremities    Reflexes: DTR are present and symmetric throughout, no clonus, plantar responses are flexor    Hip Exam: No pain with internal or external rotation of hips bilaterally    Special tests: Straight leg raise test negative. Cross straight leg test negative. ADA test negative    Vascular: Examination of the peripheral vascular system demonstrates no evidence of congestion or edema. no evidence of  lymphedema bilateral lower extremities, pulses are present and symmetric in both lower extremities. [de-identified] : MRI lumbar 6/2023 reviewed: continued Patient has significant rotatory scoliosis. There is a lateral listhesis seen at L3/4. There is severe neuroforaminal stenosis on the left at L2/3 and L3/4. There is lateral recess stenosis at L3/4 on the left. The patient has moderate to severe right sided foraminal stenosis at L4/5 and L5/S1.  MRI Lumbar Spine noncontrast reviewed 11/07/2022: Patient has significant rotatory scoliosis. There is a lateral listhesis seen at L3/4. There is severe neuroforaminal stenosis on the left at L2/3 and L3/4. There is lateral recess stenosis at L3/4 on the left. The patient has moderate to severe right sided foraminal stenosis at L4/5 and L5/S1.  XR Full Spine AP/Lateral with Lumbar flexion/extension 11/01/2022 [my read]: Lumbar scoliosis. There is lateral listhesis that is significant at L3/4. There is concavity of her scoliosis with n apex at L2/3 and L3/4 on the left. She has a fractional curve with a concavity on the right at L4/5 and L5/S1. There is multilevel disc degeneration. Her sagittal and coronal global alignment are well maintained. There is no instability seen on flexion/extension. There are no fractures seen.

## 2023-08-25 ENCOUNTER — APPOINTMENT (OUTPATIENT)
Dept: ORTHOPEDIC SURGERY | Facility: CLINIC | Age: 73
End: 2023-08-25
Payer: MEDICARE

## 2023-08-25 VITALS
DIASTOLIC BLOOD PRESSURE: 72 MMHG | HEART RATE: 63 BPM | BODY MASS INDEX: 24.59 KG/M2 | WEIGHT: 144 LBS | OXYGEN SATURATION: 98 % | SYSTOLIC BLOOD PRESSURE: 143 MMHG | HEIGHT: 64 IN

## 2023-08-25 VITALS — WEIGHT: 143 LBS | BODY MASS INDEX: 24.41 KG/M2 | HEIGHT: 64 IN

## 2023-08-25 DIAGNOSIS — M51.26 OTHER INTERVERTEBRAL DISC DISPLACEMENT, LUMBAR REGION: ICD-10-CM

## 2023-08-25 DIAGNOSIS — Z82.61 FAMILY HISTORY OF ARTHRITIS: ICD-10-CM

## 2023-08-25 DIAGNOSIS — M67.959 UNSPECIFIED DISORDER OF SYNOVIUM AND TENDON, UNSPECIFIED THIGH: ICD-10-CM

## 2023-08-25 DIAGNOSIS — M25.552 PAIN IN LEFT HIP: ICD-10-CM

## 2023-08-25 PROCEDURE — 20611 DRAIN/INJ JOINT/BURSA W/US: CPT | Mod: LT

## 2023-08-25 RX ORDER — ZOLEDRONIC ACID 4 MG
VIAL (EA) INTRAVENOUS
Refills: 0 | Status: ACTIVE | COMMUNITY

## 2023-08-25 RX ORDER — ETODOLAC 400 MG/1
400 TABLET, FILM COATED ORAL TWICE DAILY
Qty: 30 | Refills: 1 | Status: ACTIVE | COMMUNITY
Start: 2023-08-25 | End: 1900-01-01

## 2023-08-25 NOTE — PROCEDURE
[de-identified] : DOMINIK BYRNES is a 73 year old female with left lateral hip who presents for US guided GTB CSI. No recent infection, fever, chills or skin lesion.  Ultrasound guided corticosteroid injection of the greater trochanteric bursa:  Following a discussion of the risks (bleeding, infection) and benefits, verbal consent was obtained. patient placed in lateral decubitus position.  The  greater trochanteric (GT) region was identified with Sonosite US 15 MHz transducer. The greater trochanter area was anaesthetised with ethyl chloride spray then prepped with chlorhexadine. Under strict sterile technique a 25 G 1.5 inch needle was inserted at the GTB and a mixture of 1mL of 1% lidocaine and 4mL of 0.5% Bupivacaine was injected subcutaneously. After adequate anaesthesia, a 22 G 3.5 inch needle was inserted into sub-gluteus ni/medius bursal space under US guidance using inferolateral approach (long axis view). After negative aspiration, a mixture 1 ml of 40mg/mL of Triamcinolone, 1mL of 1% lidocaine and 4mL of 0.5% Bupivacaine was injected without resistance. Needle location was confirmed with US. Needle fenestration performed at the gluteus medius tendon.   The use of direct ultrasound visualization was necessary to increase patient safety by identifying and avoiding inadvertent needle placement within the neurovascular and osteochondral structures. Additionally, the increased accuracy of needle placement may improve therapeutic efficacy and allow higher diagnostic specificity when evaluating the effectiveness of this injection.  The patient tolerated the procedure well. Post-injection instructions given including rest, avoiding vigorous activity for 48 hours, and icing. Patient verbalized understanding.  Plan: Start home exercises. Handout provided. Start physical therapy Referral provided. Diclofenac gel prn. Follow up in 6-8 weeks.

## 2023-09-12 ENCOUNTER — EMERGENCY (EMERGENCY)
Facility: HOSPITAL | Age: 73
LOS: 1 days | Discharge: HOME | End: 2023-09-12
Attending: EMERGENCY MEDICINE | Admitting: EMERGENCY MEDICINE
Payer: MEDICARE

## 2023-09-12 VITALS
TEMPERATURE: 98 F | HEART RATE: 76 BPM | RESPIRATION RATE: 16 BRPM | DIASTOLIC BLOOD PRESSURE: 70 MMHG | SYSTOLIC BLOOD PRESSURE: 111 MMHG | OXYGEN SATURATION: 95 %

## 2023-09-12 VITALS
HEIGHT: 63 IN | DIASTOLIC BLOOD PRESSURE: 76 MMHG | RESPIRATION RATE: 16 BRPM | TEMPERATURE: 98 F | HEART RATE: 93 BPM | SYSTOLIC BLOOD PRESSURE: 120 MMHG | WEIGHT: 147.05 LBS | OXYGEN SATURATION: 94 %

## 2023-09-12 LAB
ALBUMIN SERPL ELPH-MCNC: 3.3 G/DL — LOW (ref 3.4–5)
ALP SERPL-CCNC: 44 U/L — SIGNIFICANT CHANGE UP (ref 40–120)
ALT FLD-CCNC: 20 U/L — SIGNIFICANT CHANGE UP (ref 12–42)
ANION GAP SERPL CALC-SCNC: 3 MMOL/L — LOW (ref 9–16)
APPEARANCE UR: ABNORMAL
AST SERPL-CCNC: 20 U/L — SIGNIFICANT CHANGE UP (ref 15–37)
BACTERIA # UR AUTO: ABNORMAL /HPF
BASOPHILS # BLD AUTO: 0.03 K/UL — SIGNIFICANT CHANGE UP (ref 0–0.2)
BASOPHILS NFR BLD AUTO: 0.3 % — SIGNIFICANT CHANGE UP (ref 0–2)
BILIRUB SERPL-MCNC: 0.8 MG/DL — SIGNIFICANT CHANGE UP (ref 0.2–1.2)
BILIRUB UR-MCNC: NEGATIVE — SIGNIFICANT CHANGE UP
BUN SERPL-MCNC: 12 MG/DL — SIGNIFICANT CHANGE UP (ref 7–23)
CALCIUM SERPL-MCNC: 9.4 MG/DL — SIGNIFICANT CHANGE UP (ref 8.5–10.5)
CHLORIDE SERPL-SCNC: 102 MMOL/L — SIGNIFICANT CHANGE UP (ref 96–108)
CO2 SERPL-SCNC: 32 MMOL/L — HIGH (ref 22–31)
COLOR SPEC: YELLOW — SIGNIFICANT CHANGE UP
CREAT SERPL-MCNC: 0.8 MG/DL — SIGNIFICANT CHANGE UP (ref 0.5–1.3)
DIFF PNL FLD: ABNORMAL
EGFR: 77 ML/MIN/1.73M2 — SIGNIFICANT CHANGE UP
EOSINOPHIL # BLD AUTO: 0.09 K/UL — SIGNIFICANT CHANGE UP (ref 0–0.5)
EOSINOPHIL NFR BLD AUTO: 1 % — SIGNIFICANT CHANGE UP (ref 0–6)
EPI CELLS # UR: PRESENT
GLUCOSE SERPL-MCNC: 113 MG/DL — HIGH (ref 70–99)
GLUCOSE UR QL: NEGATIVE MG/DL — SIGNIFICANT CHANGE UP
HCT VFR BLD CALC: 36.7 % — SIGNIFICANT CHANGE UP (ref 34.5–45)
HGB BLD-MCNC: 12.2 G/DL — SIGNIFICANT CHANGE UP (ref 11.5–15.5)
IMM GRANULOCYTES NFR BLD AUTO: 0.2 % — SIGNIFICANT CHANGE UP (ref 0–0.9)
KETONES UR-MCNC: NEGATIVE MG/DL — SIGNIFICANT CHANGE UP
LEUKOCYTE ESTERASE UR-ACNC: ABNORMAL
LIDOCAIN IGE QN: 46 U/L — SIGNIFICANT CHANGE UP (ref 16–77)
LYMPHOCYTES # BLD AUTO: 1.2 K/UL — SIGNIFICANT CHANGE UP (ref 1–3.3)
LYMPHOCYTES # BLD AUTO: 13 % — SIGNIFICANT CHANGE UP (ref 13–44)
MCHC RBC-ENTMCNC: 31.6 PG — SIGNIFICANT CHANGE UP (ref 27–34)
MCHC RBC-ENTMCNC: 33.2 GM/DL — SIGNIFICANT CHANGE UP (ref 32–36)
MCV RBC AUTO: 95.1 FL — SIGNIFICANT CHANGE UP (ref 80–100)
MONOCYTES # BLD AUTO: 1.12 K/UL — HIGH (ref 0–0.9)
MONOCYTES NFR BLD AUTO: 12.1 % — SIGNIFICANT CHANGE UP (ref 2–14)
NEUTROPHILS # BLD AUTO: 6.78 K/UL — SIGNIFICANT CHANGE UP (ref 1.8–7.4)
NEUTROPHILS NFR BLD AUTO: 73.4 % — SIGNIFICANT CHANGE UP (ref 43–77)
NITRITE UR-MCNC: NEGATIVE — SIGNIFICANT CHANGE UP
NRBC # BLD: 0 /100 WBCS — SIGNIFICANT CHANGE UP (ref 0–0)
PH UR: 8 — SIGNIFICANT CHANGE UP (ref 5–8)
PLATELET # BLD AUTO: 226 K/UL — SIGNIFICANT CHANGE UP (ref 150–400)
POTASSIUM SERPL-MCNC: 4.4 MMOL/L — SIGNIFICANT CHANGE UP (ref 3.5–5.3)
POTASSIUM SERPL-SCNC: 4.4 MMOL/L — SIGNIFICANT CHANGE UP (ref 3.5–5.3)
PROT SERPL-MCNC: 7.9 G/DL — SIGNIFICANT CHANGE UP (ref 6.4–8.2)
PROT UR-MCNC: 30 MG/DL
RBC # BLD: 3.86 M/UL — SIGNIFICANT CHANGE UP (ref 3.8–5.2)
RBC # FLD: 14 % — SIGNIFICANT CHANGE UP (ref 10.3–14.5)
RBC CASTS # UR COMP ASSIST: 4 /HPF — SIGNIFICANT CHANGE UP (ref 0–4)
SODIUM SERPL-SCNC: 137 MMOL/L — SIGNIFICANT CHANGE UP (ref 132–145)
SP GR SPEC: 1.01 — SIGNIFICANT CHANGE UP (ref 1–1.03)
UROBILINOGEN FLD QL: 1 MG/DL — SIGNIFICANT CHANGE UP (ref 0.2–1)
WBC # BLD: 9.24 K/UL — SIGNIFICANT CHANGE UP (ref 3.8–10.5)
WBC # FLD AUTO: 9.24 K/UL — SIGNIFICANT CHANGE UP (ref 3.8–10.5)
WBC UR QL: SIGNIFICANT CHANGE UP /HPF (ref 0–5)

## 2023-09-12 PROCEDURE — 99285 EMERGENCY DEPT VISIT HI MDM: CPT

## 2023-09-12 PROCEDURE — 74177 CT ABD & PELVIS W/CONTRAST: CPT | Mod: 26

## 2023-09-12 RX ORDER — CEFTRIAXONE 500 MG/1
1000 INJECTION, POWDER, FOR SOLUTION INTRAMUSCULAR; INTRAVENOUS ONCE
Refills: 0 | Status: COMPLETED | OUTPATIENT
Start: 2023-09-12 | End: 2023-09-12

## 2023-09-12 RX ORDER — CEFPODOXIME PROXETIL 100 MG
1 TABLET ORAL
Qty: 28 | Refills: 0
Start: 2023-09-12 | End: 2023-09-25

## 2023-09-12 RX ORDER — ACETAMINOPHEN 500 MG
650 TABLET ORAL ONCE
Refills: 0 | Status: COMPLETED | OUTPATIENT
Start: 2023-09-12 | End: 2023-09-12

## 2023-09-12 RX ORDER — SODIUM CHLORIDE 9 MG/ML
1000 INJECTION INTRAMUSCULAR; INTRAVENOUS; SUBCUTANEOUS ONCE
Refills: 0 | Status: COMPLETED | OUTPATIENT
Start: 2023-09-12 | End: 2023-09-12

## 2023-09-12 RX ADMIN — CEFTRIAXONE 100 MILLIGRAM(S): 500 INJECTION, POWDER, FOR SOLUTION INTRAMUSCULAR; INTRAVENOUS at 09:50

## 2023-09-12 RX ADMIN — Medication 650 MILLIGRAM(S): at 09:29

## 2023-09-12 RX ADMIN — SODIUM CHLORIDE 1000 MILLILITER(S): 9 INJECTION INTRAMUSCULAR; INTRAVENOUS; SUBCUTANEOUS at 09:29

## 2023-09-12 NOTE — ED PROVIDER NOTE - CLINICAL SUMMARY MEDICAL DECISION MAKING FREE TEXT BOX
73-year-old female with history of breast CA, recurrent UTI, chronic back pain, presents with 1 week of progressively worsening dysuria, lower abdominal pain, and left flank pain.  On exam, patient is afebrile, vital signs are stable.  Patient is well-appearing in no acute distress.  Patient has moderate lower abdominal tenderness to palpation and moderate left CVA tenderness to palpation.  Concern for UTI/pyelonephritis.  Differential includes appendectomy, diverticulitis, stone, other.  Plan for labs, analgesia, CT A/P, reassess.

## 2023-09-12 NOTE — ED PROVIDER NOTE - OBJECTIVE STATEMENT
73-year-old female with history of breast CA, status postlumpectomy, on exemestane, history of recurrent UTI, history of chronic low back pain with plan for spinal fusion next month, presents with 1 week of dysuria with progressively worsening lower abdominal pain.  No fever, chills, nausea, vomiting, diarrhea.  States when she usually gets UTIs she does not have this much abdominal pain.  Patient also complaining of pain over left side and left flank.

## 2023-09-12 NOTE — ED PROVIDER NOTE - PROGRESS NOTE DETAILS
UA positive for UTI.  CT a/p shows cystitis with L ureteritis.  Pt given ceftriaxone.  Prior UCx sensitive to cephalosporins.  Will dc w PO cefpodoxime.  Pt feels well, wants to go home.  Tolerating PO.  return precautions discussed.  Stable for dc.

## 2023-09-12 NOTE — ED ADULT NURSE NOTE - OBJECTIVE STATEMENT
Patient presents to ED c/o dysuria, urinary frequency, lower abdominal pain, and left sided back pain x 3 days, worsening last night. Patient concerned for UTI. Denies f/c, n/v/d, cp, sob, cough. Patient aox4, ambulatory with steady gait, spont unlabored breathing on ra. Patient presents to ED c/o dysuria, urinary frequency, lower abdominal pain, and left sided lower back/flank pain x 3 days, worsening last night. Patient concerned for UTI. Denies f/c, n/v/d, cp, sob, cough. Patient aox4, ambulatory with steady gait, spont unlabored breathing on ra.

## 2023-09-12 NOTE — ED PROVIDER NOTE - PHYSICAL EXAMINATION
Constitutional: awake and alert, in no acute distress  HEENT: head normocephalic and atraumatic. moist mucous membranes  Eyes: extraocular movements intact, normal conjunctiva  Neck: supple, normal ROM  Cardiovascular: regular rate   Pulmonary: no respiratory distress  Gastrointestinal: abdomen flat and nondistended, moderate RLQ and LLQ TTP, L CVA TTP  Skin: warm, dry, normal for ethnicity  Musculoskeletal: no edema, no deformity  Neurological: oriented x4, no focal neurologic deficit.   Psychiatric: calm and cooperative

## 2023-09-12 NOTE — ED PROVIDER NOTE - PATIENT PORTAL LINK FT
You can access the FollowMyHealth Patient Portal offered by [facility] by registering at the following website: [email or web address]. By joining [employer]’s FollowMyHealth portal, you will also be able to view your health information using other applications (apps) compatible with our system.

## 2023-09-15 DIAGNOSIS — Z98.890 OTHER SPECIFIED POSTPROCEDURAL STATES: ICD-10-CM

## 2023-09-15 DIAGNOSIS — N30.90 CYSTITIS, UNSPECIFIED WITHOUT HEMATURIA: ICD-10-CM

## 2023-09-15 DIAGNOSIS — N28.89 OTHER SPECIFIED DISORDERS OF KIDNEY AND URETER: ICD-10-CM

## 2023-09-15 DIAGNOSIS — R30.0 DYSURIA: ICD-10-CM

## 2023-09-15 DIAGNOSIS — N39.0 URINARY TRACT INFECTION, SITE NOT SPECIFIED: ICD-10-CM

## 2023-09-15 DIAGNOSIS — Z85.3 PERSONAL HISTORY OF MALIGNANT NEOPLASM OF BREAST: ICD-10-CM

## 2023-09-16 LAB
-  AMIKACIN: SIGNIFICANT CHANGE UP
-  AMOXICILLIN/CLAVULANIC ACID: SIGNIFICANT CHANGE UP
-  AMPICILLIN/SULBACTAM: SIGNIFICANT CHANGE UP
-  AMPICILLIN: SIGNIFICANT CHANGE UP
-  AZTREONAM: SIGNIFICANT CHANGE UP
-  CEFAZOLIN: SIGNIFICANT CHANGE UP
-  CEFEPIME: SIGNIFICANT CHANGE UP
-  CEFTRIAXONE: SIGNIFICANT CHANGE UP
-  CEFUROXIME: SIGNIFICANT CHANGE UP
-  CIPROFLOXACIN: SIGNIFICANT CHANGE UP
-  ERTAPENEM: SIGNIFICANT CHANGE UP
-  GENTAMICIN: SIGNIFICANT CHANGE UP
-  IMIPENEM: SIGNIFICANT CHANGE UP
-  LEVOFLOXACIN: SIGNIFICANT CHANGE UP
-  MEROPENEM: SIGNIFICANT CHANGE UP
-  NITROFURANTOIN: SIGNIFICANT CHANGE UP
-  PIPERACILLIN/TAZOBACTAM: SIGNIFICANT CHANGE UP
-  TOBRAMYCIN: SIGNIFICANT CHANGE UP
-  TRIMETHOPRIM/SULFAMETHOXAZOLE: SIGNIFICANT CHANGE UP
CULTURE RESULTS: SIGNIFICANT CHANGE UP
METHOD TYPE: SIGNIFICANT CHANGE UP
ORGANISM # SPEC MICROSCOPIC CNT: SIGNIFICANT CHANGE UP
ORGANISM # SPEC MICROSCOPIC CNT: SIGNIFICANT CHANGE UP
SPECIMEN SOURCE: SIGNIFICANT CHANGE UP

## 2023-09-17 RX ORDER — NITROFURANTOIN MACROCRYSTAL 50 MG
1 CAPSULE ORAL
Qty: 10 | Refills: 0
Start: 2023-09-17 | End: 2023-09-21

## 2023-10-12 ENCOUNTER — APPOINTMENT (OUTPATIENT)
Dept: ORTHOPEDIC SURGERY | Facility: CLINIC | Age: 73
End: 2023-10-12

## 2023-11-19 ENCOUNTER — EMERGENCY (EMERGENCY)
Facility: HOSPITAL | Age: 73
LOS: 1 days | Discharge: LEFT BEFORE DISCHARGE | End: 2023-11-19
Attending: EMERGENCY MEDICINE | Admitting: EMERGENCY MEDICINE
Payer: MEDICARE

## 2023-11-19 VITALS
OXYGEN SATURATION: 99 % | RESPIRATION RATE: 16 BRPM | DIASTOLIC BLOOD PRESSURE: 72 MMHG | SYSTOLIC BLOOD PRESSURE: 150 MMHG | TEMPERATURE: 98 F | HEART RATE: 69 BPM

## 2023-11-19 VITALS
HEART RATE: 62 BPM | HEIGHT: 64 IN | WEIGHT: 126.99 LBS | SYSTOLIC BLOOD PRESSURE: 123 MMHG | OXYGEN SATURATION: 95 % | TEMPERATURE: 97 F | DIASTOLIC BLOOD PRESSURE: 72 MMHG | RESPIRATION RATE: 18 BRPM

## 2023-11-19 LAB
ALBUMIN SERPL ELPH-MCNC: 2.5 G/DL — LOW (ref 3.4–5)
ALBUMIN SERPL ELPH-MCNC: 2.5 G/DL — LOW (ref 3.4–5)
ALP SERPL-CCNC: 119 U/L — SIGNIFICANT CHANGE UP (ref 40–120)
ALP SERPL-CCNC: 119 U/L — SIGNIFICANT CHANGE UP (ref 40–120)
ALT FLD-CCNC: 14 U/L — SIGNIFICANT CHANGE UP (ref 12–42)
ALT FLD-CCNC: 14 U/L — SIGNIFICANT CHANGE UP (ref 12–42)
ANION GAP SERPL CALC-SCNC: 7 MMOL/L — LOW (ref 9–16)
ANION GAP SERPL CALC-SCNC: 7 MMOL/L — LOW (ref 9–16)
APTT BLD: 37.5 SEC — HIGH (ref 24.5–35.6)
APTT BLD: 37.5 SEC — HIGH (ref 24.5–35.6)
AST SERPL-CCNC: 15 U/L — SIGNIFICANT CHANGE UP (ref 15–37)
AST SERPL-CCNC: 15 U/L — SIGNIFICANT CHANGE UP (ref 15–37)
BASOPHILS # BLD AUTO: 0.04 K/UL — SIGNIFICANT CHANGE UP (ref 0–0.2)
BASOPHILS # BLD AUTO: 0.04 K/UL — SIGNIFICANT CHANGE UP (ref 0–0.2)
BASOPHILS NFR BLD AUTO: 0.6 % — SIGNIFICANT CHANGE UP (ref 0–2)
BASOPHILS NFR BLD AUTO: 0.6 % — SIGNIFICANT CHANGE UP (ref 0–2)
BILIRUB SERPL-MCNC: 0.3 MG/DL — SIGNIFICANT CHANGE UP (ref 0.2–1.2)
BILIRUB SERPL-MCNC: 0.3 MG/DL — SIGNIFICANT CHANGE UP (ref 0.2–1.2)
BUN SERPL-MCNC: 13 MG/DL — SIGNIFICANT CHANGE UP (ref 7–23)
BUN SERPL-MCNC: 13 MG/DL — SIGNIFICANT CHANGE UP (ref 7–23)
CALCIUM SERPL-MCNC: 9.3 MG/DL — SIGNIFICANT CHANGE UP (ref 8.5–10.5)
CALCIUM SERPL-MCNC: 9.3 MG/DL — SIGNIFICANT CHANGE UP (ref 8.5–10.5)
CHLORIDE SERPL-SCNC: 99 MMOL/L — SIGNIFICANT CHANGE UP (ref 96–108)
CHLORIDE SERPL-SCNC: 99 MMOL/L — SIGNIFICANT CHANGE UP (ref 96–108)
CO2 SERPL-SCNC: 29 MMOL/L — SIGNIFICANT CHANGE UP (ref 22–31)
CO2 SERPL-SCNC: 29 MMOL/L — SIGNIFICANT CHANGE UP (ref 22–31)
CREAT SERPL-MCNC: 0.84 MG/DL — SIGNIFICANT CHANGE UP (ref 0.5–1.3)
CREAT SERPL-MCNC: 0.84 MG/DL — SIGNIFICANT CHANGE UP (ref 0.5–1.3)
EGFR: 72 ML/MIN/1.73M2 — SIGNIFICANT CHANGE UP
EGFR: 72 ML/MIN/1.73M2 — SIGNIFICANT CHANGE UP
EOSINOPHIL # BLD AUTO: 0.25 K/UL — SIGNIFICANT CHANGE UP (ref 0–0.5)
EOSINOPHIL # BLD AUTO: 0.25 K/UL — SIGNIFICANT CHANGE UP (ref 0–0.5)
EOSINOPHIL NFR BLD AUTO: 3.5 % — SIGNIFICANT CHANGE UP (ref 0–6)
EOSINOPHIL NFR BLD AUTO: 3.5 % — SIGNIFICANT CHANGE UP (ref 0–6)
GLUCOSE SERPL-MCNC: 109 MG/DL — HIGH (ref 70–99)
GLUCOSE SERPL-MCNC: 109 MG/DL — HIGH (ref 70–99)
HCT VFR BLD CALC: 36.5 % — SIGNIFICANT CHANGE UP (ref 34.5–45)
HCT VFR BLD CALC: 36.5 % — SIGNIFICANT CHANGE UP (ref 34.5–45)
HGB BLD-MCNC: 11.4 G/DL — LOW (ref 11.5–15.5)
HGB BLD-MCNC: 11.4 G/DL — LOW (ref 11.5–15.5)
IMM GRANULOCYTES NFR BLD AUTO: 0.3 % — SIGNIFICANT CHANGE UP (ref 0–0.9)
IMM GRANULOCYTES NFR BLD AUTO: 0.3 % — SIGNIFICANT CHANGE UP (ref 0–0.9)
INR BLD: 1.32 — HIGH (ref 0.85–1.18)
INR BLD: 1.32 — HIGH (ref 0.85–1.18)
LACTATE BLDV-MCNC: 1 MMOL/L — SIGNIFICANT CHANGE UP (ref 0.5–2)
LACTATE BLDV-MCNC: 1 MMOL/L — SIGNIFICANT CHANGE UP (ref 0.5–2)
LYMPHOCYTES # BLD AUTO: 1.82 K/UL — SIGNIFICANT CHANGE UP (ref 1–3.3)
LYMPHOCYTES # BLD AUTO: 1.82 K/UL — SIGNIFICANT CHANGE UP (ref 1–3.3)
LYMPHOCYTES # BLD AUTO: 25.7 % — SIGNIFICANT CHANGE UP (ref 13–44)
LYMPHOCYTES # BLD AUTO: 25.7 % — SIGNIFICANT CHANGE UP (ref 13–44)
MCHC RBC-ENTMCNC: 29.9 PG — SIGNIFICANT CHANGE UP (ref 27–34)
MCHC RBC-ENTMCNC: 29.9 PG — SIGNIFICANT CHANGE UP (ref 27–34)
MCHC RBC-ENTMCNC: 31.2 GM/DL — LOW (ref 32–36)
MCHC RBC-ENTMCNC: 31.2 GM/DL — LOW (ref 32–36)
MCV RBC AUTO: 95.8 FL — SIGNIFICANT CHANGE UP (ref 80–100)
MCV RBC AUTO: 95.8 FL — SIGNIFICANT CHANGE UP (ref 80–100)
MONOCYTES # BLD AUTO: 0.59 K/UL — SIGNIFICANT CHANGE UP (ref 0–0.9)
MONOCYTES # BLD AUTO: 0.59 K/UL — SIGNIFICANT CHANGE UP (ref 0–0.9)
MONOCYTES NFR BLD AUTO: 8.3 % — SIGNIFICANT CHANGE UP (ref 2–14)
MONOCYTES NFR BLD AUTO: 8.3 % — SIGNIFICANT CHANGE UP (ref 2–14)
NEUTROPHILS # BLD AUTO: 4.37 K/UL — SIGNIFICANT CHANGE UP (ref 1.8–7.4)
NEUTROPHILS # BLD AUTO: 4.37 K/UL — SIGNIFICANT CHANGE UP (ref 1.8–7.4)
NEUTROPHILS NFR BLD AUTO: 61.6 % — SIGNIFICANT CHANGE UP (ref 43–77)
NEUTROPHILS NFR BLD AUTO: 61.6 % — SIGNIFICANT CHANGE UP (ref 43–77)
NRBC # BLD: 0 /100 WBCS — SIGNIFICANT CHANGE UP (ref 0–0)
NRBC # BLD: 0 /100 WBCS — SIGNIFICANT CHANGE UP (ref 0–0)
PLATELET # BLD AUTO: 316 K/UL — SIGNIFICANT CHANGE UP (ref 150–400)
PLATELET # BLD AUTO: 316 K/UL — SIGNIFICANT CHANGE UP (ref 150–400)
POTASSIUM SERPL-MCNC: 4.2 MMOL/L — SIGNIFICANT CHANGE UP (ref 3.5–5.3)
POTASSIUM SERPL-MCNC: 4.2 MMOL/L — SIGNIFICANT CHANGE UP (ref 3.5–5.3)
POTASSIUM SERPL-SCNC: 4.2 MMOL/L — SIGNIFICANT CHANGE UP (ref 3.5–5.3)
POTASSIUM SERPL-SCNC: 4.2 MMOL/L — SIGNIFICANT CHANGE UP (ref 3.5–5.3)
PROT SERPL-MCNC: 7.7 G/DL — SIGNIFICANT CHANGE UP (ref 6.4–8.2)
PROT SERPL-MCNC: 7.7 G/DL — SIGNIFICANT CHANGE UP (ref 6.4–8.2)
PROTHROM AB SERPL-ACNC: 14.8 SEC — HIGH (ref 9.5–13)
PROTHROM AB SERPL-ACNC: 14.8 SEC — HIGH (ref 9.5–13)
RBC # BLD: 3.81 M/UL — SIGNIFICANT CHANGE UP (ref 3.8–5.2)
RBC # BLD: 3.81 M/UL — SIGNIFICANT CHANGE UP (ref 3.8–5.2)
RBC # FLD: 14.8 % — HIGH (ref 10.3–14.5)
RBC # FLD: 14.8 % — HIGH (ref 10.3–14.5)
SODIUM SERPL-SCNC: 135 MMOL/L — SIGNIFICANT CHANGE UP (ref 132–145)
SODIUM SERPL-SCNC: 135 MMOL/L — SIGNIFICANT CHANGE UP (ref 132–145)
WBC # BLD: 7.09 K/UL — SIGNIFICANT CHANGE UP (ref 3.8–10.5)
WBC # BLD: 7.09 K/UL — SIGNIFICANT CHANGE UP (ref 3.8–10.5)
WBC # FLD AUTO: 7.09 K/UL — SIGNIFICANT CHANGE UP (ref 3.8–10.5)
WBC # FLD AUTO: 7.09 K/UL — SIGNIFICANT CHANGE UP (ref 3.8–10.5)

## 2023-11-19 PROCEDURE — 72132 CT LUMBAR SPINE W/DYE: CPT | Mod: 26

## 2023-11-19 PROCEDURE — 99285 EMERGENCY DEPT VISIT HI MDM: CPT

## 2023-11-19 RX ORDER — ACETAMINOPHEN 500 MG
1000 TABLET ORAL ONCE
Refills: 0 | Status: COMPLETED | OUTPATIENT
Start: 2023-11-19 | End: 2023-11-19

## 2023-11-19 RX ORDER — ACETAMINOPHEN 500 MG
650 TABLET ORAL ONCE
Refills: 0 | Status: COMPLETED | OUTPATIENT
Start: 2023-11-19 | End: 2023-11-19

## 2023-11-19 RX ADMIN — Medication 400 MILLIGRAM(S): at 16:51

## 2023-11-19 NOTE — ED ADULT NURSE REASSESSMENT NOTE - NS ED NURSE REASSESS COMMENT FT1
pt stated wanting to leave. requested to have IV removed. MD Fernandez made aware. Pt educated on importance of waiting for CT results. Pt still requesting to leave. IV removed, pt left in good condition.

## 2023-11-19 NOTE — ED ADULT NURSE NOTE - NS ED NOTE  TALK SOMEONE YN
Registered Nurse to visit the day after hospital discharge; Physical Therapist to follow. Please contact the home care agency at the above phone number if you have not heard from them by 12 noon on the day after your hospital discharge. No

## 2023-11-19 NOTE — ED ADULT TRIAGE NOTE - CHIEF COMPLAINT QUOTE
Pt sent by surgeon for post-op complication. Pt had spinal fusion sx at [facility] on 10/17. Pt c/o pain and swelling at incision site of back. Pt sent pics to surgeon who dx her with Seroma but is unable to see her in office for several weeks.

## 2023-11-19 NOTE — ED ADULT NURSE NOTE - NSFALLRISKINTERV_ED_ALL_ED
Assistance OOB with selected safe patient handling equipment if applicable/Communicate fall risk and risk factors to all staff, patient, and family/Encourage patient to sit up slowly, dangle for a short time, stand at bedside before walking/Orthostatic vital signs/Provide visual cue: yellow wristband, yellow gown, etc/Reinforce activity limits and safety measures with patient and family/Review medications for side effects contributing to fall risk/Toileting schedule using arm’s reach rule for commode and bathroom/Call bell, personal items and telephone in reach/Instruct patient to call for assistance before getting out of bed/chair/stretcher/Non-slip footwear applied when patient is off stretcher/[place] to call system/Physically safe environment - no spills, clutter or unnecessary equipment/Purposeful Proactive Rounding/Room/bathroom lighting operational, light cord in reach

## 2023-11-19 NOTE — ED ADULT NURSE NOTE - CHIEF COMPLAINT QUOTE
Pt sent by surgeon for post-op complication. Pt had spinal fusion sx at [facility] on 10/17. Pt c/o pain and swelling at incision site of back. Pt sent pics to surgeon who dx her with Seroma but is unable to see her in office for several weeks.

## 2023-11-19 NOTE — ED ADULT NURSE NOTE - CHPI ED NUR TIMING2
Individualized Plan of [address]  56 y o  female MRN: [id number]  Unit/Bed#: -01 Encounter: [id number]     PATIENT INFORMATION  ADMISSION DATE: 1/23/2020  2:28 PM NELSON CATEGORY: CVA   ADMISSION DIAGNOSIS: Stroke (cerebrum) ([facility] [address] ) [I63 9]  EXPECTED LOS: 1-2 wks     MEDICAL/FUNCTIONAL PROGNOSIS  Based on my assessment of the patient's medical conditions and current functional status, the prognosis for attaining medical and functional goals or the IRF stay is:  Fair     Medical Goals: risk factor modification     ANTICIPATED DISCHARGE DISPOSITION AND SERVICES  COMMUNITY SETTING: home     ANTICIPATED FOLLOW-UP SERVICE:   Outpatient Therapy Services: PT/OT/ST   DISCIPLINE SPECIFIC PLANS:  Required Disciplines & Services: PT/OT/ST     REQUIRED THERAPY:  Therapy Hours per Day Days per Week Total Days   Physical Therapy 1 5 10   Occupational Therapy 1 5 10   Speech/Language Therapy 1 5 10   NOTE: Additional therapy time(s) may be added as appropriate to meet patient needs and to achieve functional goals          ANTICIPATED FUNCTIONAL OUTCOMES:  ADL: Patient will be independent with ADLs with least restrictive device upon completion of rehab program   Bladder/Bowel: Patient will return to premorbid level for bladder/bowel management upon completion of rehab program   Transfers: Patient will be independent with transfers with least restrictive device upon completion of rehab program   Locomotion: Patient will be independent with locomotion with least restrictive device upon completion of rehab program   Cognitive: Patient will maximize level for cognitive tasks upon completion of the rehab program      DISCHARGE PLANNING NEEDS  Equipment needs: tbd       REHAB ANTICIPATED PARTICIPATION RESTRICTIONS:  None
Labs reviewed, hemoglobin is currently 8 had 2 units packed red blood cells  Patient was seen by GI plan for EGD and then for possible colonoscopy if EGD is negative  Iron is low will give 200 mg Venofer x2 and check a CBC in the a m      Vitals are stable
worsening

## 2023-11-19 NOTE — ED ADULT NURSE NOTE - NS ED NURSE LEVEL OF CONSCIOUSNESS MENTAL STATUS
[Patient] : patient [Follow Up] : a follow up visit [Mother] : mother [FreeTextEntry1] : Left elbow radial neck fracture 4 weeks status post injury. Alert

## 2023-11-19 NOTE — ED PROVIDER NOTE - SKIN, MLM
Well healing midline surgical scar over the back. Large area of non tender fluctuance involving thoracic and lumbar spine. No erythema. No induration of skin.

## 2023-11-19 NOTE — ED PROVIDER NOTE - OBJECTIVE STATEMENT
74 y/o F with PMH of lumbar spinal fusion that took place in October 2023 at [facility] presents to the ED for evaluation. Pt reports having a "liquid bubble" over the surgical site that developed over the last 2 weeks. She did not follow up with her surgeon for this. Over the last few days, the swelling has been increasing. She also c/o numbness that radiates down to her R leg [she did not speak to her surgeon about this]. Pt called her plastic surgeon today and was recommended ED evaluation for possible liquid aspiration. Pt states she has been wearing the binder as instructed since the surgery. Pt denies fevers or chills. Pt takes Oxycodone for pain control.

## 2023-11-19 NOTE — ED ADULT NURSE NOTE - OBJECTIVE STATEMENT
Pt sent by surgeon for post-op complication. Pt had spinal fusion sx at [facility] on 10/17. Pt c/o pain and swelling at incision site of back. Pt sent pics to surgeon who dx her with Seroma but is unable to see her in office for several weeks. Denies fever, chills, purulent drainage from incision site, redness, nausea, vomiting, dizziness.

## 2023-11-20 NOTE — ED POST DISCHARGE NOTE - ADDITIONAL DOCUMENTATION
Spoke with patient over the phone and discussed results in detail, she has follow-up at [facility], I informed her that it is likely seroma but abscess should be considered.  She reports no fever, she has an appointment with her doctor.  I offered to leave her a copy of her results on CD and report to be picked up at [facility].  She stated her home attendant, SARAH, will be picking up her results.

## 2023-11-21 DIAGNOSIS — Z53.29 PROCEDURE AND TREATMENT NOT CARRIED OUT BECAUSE OF PATIENT'S DECISION FOR OTHER REASONS: ICD-10-CM

## 2023-11-21 DIAGNOSIS — M54.50 LOW BACK PAIN, UNSPECIFIED: ICD-10-CM

## 2023-11-21 DIAGNOSIS — R20.0 ANESTHESIA OF SKIN: ICD-10-CM

## 2023-11-21 DIAGNOSIS — M54.6 PAIN IN THORACIC SPINE: ICD-10-CM

## 2023-11-21 DIAGNOSIS — Z85.3 PERSONAL HISTORY OF MALIGNANT NEOPLASM OF BREAST: ICD-10-CM

## 2023-11-21 DIAGNOSIS — Z98.1 ARTHRODESIS STATUS: ICD-10-CM

## 2023-11-24 ENCOUNTER — EMERGENCY (EMERGENCY)
Facility: HOSPITAL | Age: 73
LOS: 1 days | Discharge: HOME | End: 2023-11-24
Attending: EMERGENCY MEDICINE | Admitting: EMERGENCY MEDICINE
Payer: MEDICARE

## 2023-11-24 VITALS
WEIGHT: 136.91 LBS | DIASTOLIC BLOOD PRESSURE: 77 MMHG | RESPIRATION RATE: 16 BRPM | SYSTOLIC BLOOD PRESSURE: 160 MMHG | TEMPERATURE: 97 F | HEART RATE: 71 BPM | OXYGEN SATURATION: 99 %

## 2023-11-24 LAB
ALBUMIN SERPL ELPH-MCNC: 3 G/DL — LOW (ref 3.4–5)
ALBUMIN SERPL ELPH-MCNC: 3 G/DL — LOW (ref 3.4–5)
ALP SERPL-CCNC: 127 U/L — HIGH (ref 40–120)
ALP SERPL-CCNC: 127 U/L — HIGH (ref 40–120)
ALT FLD-CCNC: 13 U/L — SIGNIFICANT CHANGE UP (ref 12–42)
ALT FLD-CCNC: 13 U/L — SIGNIFICANT CHANGE UP (ref 12–42)
ANION GAP SERPL CALC-SCNC: 11 MMOL/L — SIGNIFICANT CHANGE UP (ref 9–16)
ANION GAP SERPL CALC-SCNC: 11 MMOL/L — SIGNIFICANT CHANGE UP (ref 9–16)
AST SERPL-CCNC: 22 U/L — SIGNIFICANT CHANGE UP (ref 15–37)
AST SERPL-CCNC: 22 U/L — SIGNIFICANT CHANGE UP (ref 15–37)
BASOPHILS # BLD AUTO: 0.04 K/UL — SIGNIFICANT CHANGE UP (ref 0–0.2)
BASOPHILS # BLD AUTO: 0.04 K/UL — SIGNIFICANT CHANGE UP (ref 0–0.2)
BASOPHILS NFR BLD AUTO: 0.4 % — SIGNIFICANT CHANGE UP (ref 0–2)
BASOPHILS NFR BLD AUTO: 0.4 % — SIGNIFICANT CHANGE UP (ref 0–2)
BILIRUB SERPL-MCNC: 0.3 MG/DL — SIGNIFICANT CHANGE UP (ref 0.2–1.2)
BILIRUB SERPL-MCNC: 0.3 MG/DL — SIGNIFICANT CHANGE UP (ref 0.2–1.2)
BUN SERPL-MCNC: 16 MG/DL — SIGNIFICANT CHANGE UP (ref 7–23)
BUN SERPL-MCNC: 16 MG/DL — SIGNIFICANT CHANGE UP (ref 7–23)
CALCIUM SERPL-MCNC: 9.8 MG/DL — SIGNIFICANT CHANGE UP (ref 8.5–10.5)
CALCIUM SERPL-MCNC: 9.8 MG/DL — SIGNIFICANT CHANGE UP (ref 8.5–10.5)
CHLORIDE SERPL-SCNC: 100 MMOL/L — SIGNIFICANT CHANGE UP (ref 96–108)
CHLORIDE SERPL-SCNC: 100 MMOL/L — SIGNIFICANT CHANGE UP (ref 96–108)
CO2 SERPL-SCNC: 28 MMOL/L — SIGNIFICANT CHANGE UP (ref 22–31)
CO2 SERPL-SCNC: 28 MMOL/L — SIGNIFICANT CHANGE UP (ref 22–31)
CREAT SERPL-MCNC: 0.86 MG/DL — SIGNIFICANT CHANGE UP (ref 0.5–1.3)
CREAT SERPL-MCNC: 0.86 MG/DL — SIGNIFICANT CHANGE UP (ref 0.5–1.3)
EGFR: 70 ML/MIN/1.73M2 — SIGNIFICANT CHANGE UP
EGFR: 70 ML/MIN/1.73M2 — SIGNIFICANT CHANGE UP
EOSINOPHIL # BLD AUTO: 0.04 K/UL — SIGNIFICANT CHANGE UP (ref 0–0.5)
EOSINOPHIL # BLD AUTO: 0.04 K/UL — SIGNIFICANT CHANGE UP (ref 0–0.5)
EOSINOPHIL NFR BLD AUTO: 0.4 % — SIGNIFICANT CHANGE UP (ref 0–6)
EOSINOPHIL NFR BLD AUTO: 0.4 % — SIGNIFICANT CHANGE UP (ref 0–6)
GLUCOSE SERPL-MCNC: 172 MG/DL — HIGH (ref 70–99)
GLUCOSE SERPL-MCNC: 172 MG/DL — HIGH (ref 70–99)
HCT VFR BLD CALC: 34.3 % — LOW (ref 34.5–45)
HCT VFR BLD CALC: 34.3 % — LOW (ref 34.5–45)
HGB BLD-MCNC: 11.2 G/DL — LOW (ref 11.5–15.5)
HGB BLD-MCNC: 11.2 G/DL — LOW (ref 11.5–15.5)
IMM GRANULOCYTES NFR BLD AUTO: 0.5 % — SIGNIFICANT CHANGE UP (ref 0–0.9)
IMM GRANULOCYTES NFR BLD AUTO: 0.5 % — SIGNIFICANT CHANGE UP (ref 0–0.9)
LACTATE BLDV-MCNC: 2.1 MMOL/L — HIGH (ref 0.5–2)
LACTATE BLDV-MCNC: 2.1 MMOL/L — HIGH (ref 0.5–2)
LIDOCAIN IGE QN: 100 U/L — HIGH (ref 16–77)
LIDOCAIN IGE QN: 100 U/L — HIGH (ref 16–77)
LYMPHOCYTES # BLD AUTO: 1.83 K/UL — SIGNIFICANT CHANGE UP (ref 1–3.3)
LYMPHOCYTES # BLD AUTO: 1.83 K/UL — SIGNIFICANT CHANGE UP (ref 1–3.3)
LYMPHOCYTES # BLD AUTO: 17 % — SIGNIFICANT CHANGE UP (ref 13–44)
LYMPHOCYTES # BLD AUTO: 17 % — SIGNIFICANT CHANGE UP (ref 13–44)
MAGNESIUM SERPL-MCNC: 2.2 MG/DL — SIGNIFICANT CHANGE UP (ref 1.6–2.6)
MAGNESIUM SERPL-MCNC: 2.2 MG/DL — SIGNIFICANT CHANGE UP (ref 1.6–2.6)
MCHC RBC-ENTMCNC: 29 PG — SIGNIFICANT CHANGE UP (ref 27–34)
MCHC RBC-ENTMCNC: 29 PG — SIGNIFICANT CHANGE UP (ref 27–34)
MCHC RBC-ENTMCNC: 32.7 GM/DL — SIGNIFICANT CHANGE UP (ref 32–36)
MCHC RBC-ENTMCNC: 32.7 GM/DL — SIGNIFICANT CHANGE UP (ref 32–36)
MCV RBC AUTO: 88.9 FL — SIGNIFICANT CHANGE UP (ref 80–100)
MCV RBC AUTO: 88.9 FL — SIGNIFICANT CHANGE UP (ref 80–100)
MONOCYTES # BLD AUTO: 0.42 K/UL — SIGNIFICANT CHANGE UP (ref 0–0.9)
MONOCYTES # BLD AUTO: 0.42 K/UL — SIGNIFICANT CHANGE UP (ref 0–0.9)
MONOCYTES NFR BLD AUTO: 3.9 % — SIGNIFICANT CHANGE UP (ref 2–14)
MONOCYTES NFR BLD AUTO: 3.9 % — SIGNIFICANT CHANGE UP (ref 2–14)
NEUTROPHILS # BLD AUTO: 8.4 K/UL — HIGH (ref 1.8–7.4)
NEUTROPHILS # BLD AUTO: 8.4 K/UL — HIGH (ref 1.8–7.4)
NEUTROPHILS NFR BLD AUTO: 77.8 % — HIGH (ref 43–77)
NEUTROPHILS NFR BLD AUTO: 77.8 % — HIGH (ref 43–77)
NRBC # BLD: 0 /100 WBCS — SIGNIFICANT CHANGE UP (ref 0–0)
NRBC # BLD: 0 /100 WBCS — SIGNIFICANT CHANGE UP (ref 0–0)
NT-PROBNP SERPL-SCNC: 292 PG/ML — SIGNIFICANT CHANGE UP
NT-PROBNP SERPL-SCNC: 292 PG/ML — SIGNIFICANT CHANGE UP
PLATELET # BLD AUTO: 480 K/UL — HIGH (ref 150–400)
PLATELET # BLD AUTO: 480 K/UL — HIGH (ref 150–400)
POTASSIUM SERPL-MCNC: 3.5 MMOL/L — SIGNIFICANT CHANGE UP (ref 3.5–5.3)
POTASSIUM SERPL-MCNC: 3.5 MMOL/L — SIGNIFICANT CHANGE UP (ref 3.5–5.3)
POTASSIUM SERPL-SCNC: 3.5 MMOL/L — SIGNIFICANT CHANGE UP (ref 3.5–5.3)
POTASSIUM SERPL-SCNC: 3.5 MMOL/L — SIGNIFICANT CHANGE UP (ref 3.5–5.3)
PROT SERPL-MCNC: 8.8 G/DL — HIGH (ref 6.4–8.2)
PROT SERPL-MCNC: 8.8 G/DL — HIGH (ref 6.4–8.2)
RBC # BLD: 3.86 M/UL — SIGNIFICANT CHANGE UP (ref 3.8–5.2)
RBC # BLD: 3.86 M/UL — SIGNIFICANT CHANGE UP (ref 3.8–5.2)
RBC # FLD: 14.9 % — HIGH (ref 10.3–14.5)
RBC # FLD: 14.9 % — HIGH (ref 10.3–14.5)
SODIUM SERPL-SCNC: 139 MMOL/L — SIGNIFICANT CHANGE UP (ref 132–145)
SODIUM SERPL-SCNC: 139 MMOL/L — SIGNIFICANT CHANGE UP (ref 132–145)
TROPONIN I, HIGH SENSITIVITY RESULT: 10.4 NG/L — SIGNIFICANT CHANGE UP
TROPONIN I, HIGH SENSITIVITY RESULT: 10.4 NG/L — SIGNIFICANT CHANGE UP
TSH SERPL-MCNC: 1.08 UIU/ML — SIGNIFICANT CHANGE UP (ref 0.36–3.74)
TSH SERPL-MCNC: 1.08 UIU/ML — SIGNIFICANT CHANGE UP (ref 0.36–3.74)
WBC # BLD: 10.78 K/UL — HIGH (ref 3.8–10.5)
WBC # BLD: 10.78 K/UL — HIGH (ref 3.8–10.5)
WBC # FLD AUTO: 10.78 K/UL — HIGH (ref 3.8–10.5)
WBC # FLD AUTO: 10.78 K/UL — HIGH (ref 3.8–10.5)

## 2023-11-24 PROCEDURE — 99285 EMERGENCY DEPT VISIT HI MDM: CPT

## 2023-11-24 RX ORDER — FAMOTIDINE 10 MG/ML
20 INJECTION INTRAVENOUS ONCE
Refills: 0 | Status: COMPLETED | OUTPATIENT
Start: 2023-11-24 | End: 2023-11-24

## 2023-11-24 RX ORDER — SODIUM CHLORIDE 9 MG/ML
1000 INJECTION INTRAMUSCULAR; INTRAVENOUS; SUBCUTANEOUS ONCE
Refills: 0 | Status: COMPLETED | OUTPATIENT
Start: 2023-11-24 | End: 2023-11-24

## 2023-11-24 RX ORDER — MORPHINE SULFATE 50 MG/1
2 CAPSULE, EXTENDED RELEASE ORAL ONCE
Refills: 0 | Status: DISCONTINUED | OUTPATIENT
Start: 2023-11-24 | End: 2023-11-24

## 2023-11-24 RX ORDER — HYDROMORPHONE HYDROCHLORIDE 2 MG/ML
1 INJECTION INTRAMUSCULAR; INTRAVENOUS; SUBCUTANEOUS ONCE
Refills: 0 | Status: DISCONTINUED | OUTPATIENT
Start: 2023-11-24 | End: 2023-11-24

## 2023-11-24 RX ORDER — ONDANSETRON 8 MG/1
4 TABLET, FILM COATED ORAL ONCE
Refills: 0 | Status: COMPLETED | OUTPATIENT
Start: 2023-11-24 | End: 2023-11-24

## 2023-11-24 RX ADMIN — FAMOTIDINE 20 MILLIGRAM(S): 10 INJECTION INTRAVENOUS at 23:20

## 2023-11-24 RX ADMIN — SODIUM CHLORIDE 1000 MILLILITER(S): 9 INJECTION INTRAMUSCULAR; INTRAVENOUS; SUBCUTANEOUS at 23:20

## 2023-11-24 RX ADMIN — MORPHINE SULFATE 2 MILLIGRAM(S): 50 CAPSULE, EXTENDED RELEASE ORAL at 23:20

## 2023-11-24 RX ADMIN — MORPHINE SULFATE 2 MILLIGRAM(S): 50 CAPSULE, EXTENDED RELEASE ORAL at 23:36

## 2023-11-24 RX ADMIN — ONDANSETRON 4 MILLIGRAM(S): 8 TABLET, FILM COATED ORAL at 23:20

## 2023-11-24 RX ADMIN — Medication 30 MILLILITER(S): at 23:37

## 2023-11-24 NOTE — ED PROVIDER NOTE - PROGRESS NOTE DETAILS
repeat exams since arrival show minimal improvement in symptoms. Patient given repeat dose of morphine followed by dilaudid. Will attempt haldol as an approach to treat abdominal pain. repeat exams since arrival show minimal improvement in symptoms. Patient given repeat dose of morphine followed by dilaudid which has caused improvement in pain. CT report communicated with patient, specifically regarding concern for possible gallbladder carcinoma and blockage of the common bile duct. Patient states that her pain is gone and she would prefer to have this worked up with her physicians, as she is feeling well now. I stressed the importance of very close follow up and that if there is an obstruction to the bile duct, it can cause worsening pain and possibly lead to a severe infection. Patient acknowledges these concerns and would prefer to try attempt a PO challenge and go home if tolerating. Patient tolerated PO. Would still like to go home. Will dc. Conservative management discussed with the patient in detail.  Close PMD follow up encouraged.  Strict ED return instructions discussed in detail and patient given the opportunity to ask any questions about their discharge diagnosis and instructions

## 2023-11-24 NOTE — ED PROVIDER NOTE - PATIENT PORTAL LINK FT
You can access the FollowMyHealth Patient Portal offered by [facility] by registering at the following website: [email or web address]. By joining [employer]’s FollowMyHealth portal, you will also be able to view your health information using other applications (apps) compatible with our system.

## 2023-11-24 NOTE — ED ADULT TRIAGE NOTE - RESPIRATORY RATE (BREATHS/MIN)
Telephone Encounter by Hilda Olivo CMA at 07/06/17 02:42 PM     Author:  Hilda Olivo CMA Service:  (none) Author Type:  Certified Medical Assistant     Filed:  07/06/17 02:43 PM Encounter Date:  7/1/2017 Status:  Signed     :  Hilda Olivo CMA (Certified Medical Assistant)            Exception refill, pt needs appt.[LF1.1M]      Revision History        User Key Date/Time User Provider Type Action    > LF1.1 07/06/17 02:43 PM Hilda Olivo CMA Certified Medical Assistant Sign    M - Manual             16

## 2023-11-24 NOTE — ED PROVIDER NOTE - NSFOLLOWUPINSTRUCTIONS_ED_ALL_ED_FT
You had concerning findings on your CT scan including: focal dilation of your common bile duct (a procedure called an MRCP is recommended) and wall thickening to part of your gallbladder with surrounding  necrotic lymph nodes concerning for possible carcinoma. Please have this worked up ASAP and notify your primary care physician of these results. If you have return of pain, fevers, vomiting, or any other concerns, please seek immediate medical attention. A staff member will be contacting you in the next day or two to confirm that you are getting the proper follow up.     If you would like to see a Gastroenterologist, please call one of the offices listed below:    [facility]  [address] [place] [address] [phone]    Medicine Specialties at [address]  [address] [place] [address] [phone]

## 2023-11-24 NOTE — ED ADULT NURSE NOTE - OBJECTIVE STATEMENT
72 y/o female bibems from home for eval of generalized abdominal pain since 1630 accomp. w/ nausea/vomiting. patient endorses hx of back surgery in oct 17th. patient endorses abdominal bloating. IV established w/ fluids and pain medications provided. pending imaging. patient is alert verbal oriented x3 able to make needs known

## 2023-11-24 NOTE — ED PROVIDER NOTE - OBJECTIVE STATEMENT
73F breast CA (oral meds), spinal T12-sacrum spinal fusion 10/17, on eliquis for afib  presents with severe, diffuse, sharp, generalized abdominal pain associated with nausea/vomiting since 430p. Took oxycodone at home without relief. Denies prior abd surgeries, chest pain, fevers/chills, urinary changes. Ate pineapple, soup and ice cream prior to symptom onset.

## 2023-11-24 NOTE — ED ADULT NURSE NOTE - NSFALLUNIVINTERV_ED_ALL_ED
Bed/Stretcher in lowest position, wheels locked, appropriate side rails in place/Call bell, personal items and telephone in reach/Instruct patient to call for assistance before getting out of bed/chair/stretcher/Non-slip footwear applied when patient is off stretcher/[place] to call system/Physically safe environment - no spills, clutter or unnecessary equipment/Purposeful proactive rounding/Room/bathroom lighting operational, light cord in reach

## 2023-11-24 NOTE — ED PROVIDER NOTE - NSPTACCESSSVCSAPPTDETAILS_ED_ALL_ED_FT
Bulky irregular wall thickening of the gallbladder fundus. Few enlarged necrotic lymph nodes in the portacaval region. 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

## 2023-11-24 NOTE — ED ADULT TRIAGE NOTE - CHIEF COMPLAINT QUOTE
Pt BIBA c/o abdominal pain with n/v starting around 430pm. States recently had spinal surgery on Oct 17, takes PRN oxycodone.

## 2023-11-24 NOTE — ED PROVIDER NOTE - PHYSICAL EXAMINATION
Patient A&Ox3, well-appearing, and in acute distress. Head NCAT. Heart rate regular, with no murmurs/gallops/clicks/rubs. Breath sounds clear to auscultation bilaterally. Abdomen with tenderness to palpation to the entire abd but most notably in the upper quadrants; ND, soft, with normal bowel sounds. Skin warm and dry.

## 2023-11-24 NOTE — ED PROVIDER NOTE - CLINICAL SUMMARY MEDICAL DECISION MAKING FREE TEXT BOX
73F breast CA (oral meds), spinal T12-sacrum spinal fusion 10/17, on eliquis for afib  presents with diffuse, sharp, generalized abdominal pain associated with nausea/vomiting since 430p. Took oxycodone at home without relief. Denies prior abd surgeries, chest pain, fevers/chills, urinary changes. Ate pineapple, soup and ice cream prior to symptom onset.    Patient A&Ox3, well-appearing, and in no acute distress. Head NCAT. Heart rate regular, with no murmurs/gallops/clicks/rubs. Breath sounds clear to auscultation bilaterally. Abdomen tenderness to palpation to the entire abd but most notably in the upper quadrants; ND, soft, with normal bowel sounds. Skin warm and dry.     MDM: patient presents with diffuse severe abdominal pain associated with nausea/vomiting. Possible acute abd such as ischemic bowel, appy, chela or gastric such as PUD/gastritis/GERD or pancreatitis. WIll obtain labs and give meds for symptom control and obtain CTap for imaging. 73F breast CA (oral meds), spinal T12-sacrum spinal fusion 10/17, on eliquis for afib  presents with diffuse, sharp, generalized abdominal pain associated with nausea/vomiting since 430p. Took oxycodone at home without relief. Denies prior abd surgeries, chest pain, fevers/chills, urinary changes. Ate pineapple, soup and ice cream prior to symptom onset.    Patient A&Ox3, well-appearing, and in acute distress. Head NCAT. Heart rate regular, with no murmurs/gallops/clicks/rubs. Breath sounds clear to auscultation bilaterally. Abdomen with tenderness to palpation to the entire abd but most notably in the upper quadrants; ND, soft, with normal bowel sounds. Skin warm and dry.     MDM: patient presents with diffuse severe abdominal pain associated with nausea/vomiting. Possible acute abd such as ischemic bowel, appy, chela or gastric such as PUD/gastritis/GERD or pancreatitis. WIll obtain labs and give meds for symptom control and obtain CTap for imaging.

## 2023-11-25 VITALS
SYSTOLIC BLOOD PRESSURE: 150 MMHG | RESPIRATION RATE: 16 BRPM | OXYGEN SATURATION: 98 % | DIASTOLIC BLOOD PRESSURE: 75 MMHG | HEART RATE: 63 BPM | TEMPERATURE: 98 F

## 2023-11-25 LAB
CULTURE RESULTS: SIGNIFICANT CHANGE UP
FLUAV AG NPH QL: SIGNIFICANT CHANGE UP
FLUAV AG NPH QL: SIGNIFICANT CHANGE UP
FLUBV AG NPH QL: SIGNIFICANT CHANGE UP
FLUBV AG NPH QL: SIGNIFICANT CHANGE UP
LACTATE BLDV-MCNC: 1.2 MMOL/L — SIGNIFICANT CHANGE UP (ref 0.5–2)
LACTATE BLDV-MCNC: 1.2 MMOL/L — SIGNIFICANT CHANGE UP (ref 0.5–2)
RSV RNA NPH QL NAA+NON-PROBE: SIGNIFICANT CHANGE UP
RSV RNA NPH QL NAA+NON-PROBE: SIGNIFICANT CHANGE UP
SARS-COV-2 RNA SPEC QL NAA+PROBE: SIGNIFICANT CHANGE UP
SARS-COV-2 RNA SPEC QL NAA+PROBE: SIGNIFICANT CHANGE UP
SPECIMEN SOURCE: SIGNIFICANT CHANGE UP

## 2023-11-25 PROCEDURE — 74174 CTA ABD&PLVS W/CONTRAST: CPT | Mod: 26

## 2023-11-25 RX ORDER — HALOPERIDOL DECANOATE 100 MG/ML
2 INJECTION INTRAMUSCULAR ONCE
Refills: 0 | Status: DISCONTINUED | OUTPATIENT
Start: 2023-11-25 | End: 2023-11-25

## 2023-11-25 RX ADMIN — HYDROMORPHONE HYDROCHLORIDE 1 MILLIGRAM(S): 2 INJECTION INTRAMUSCULAR; INTRAVENOUS; SUBCUTANEOUS at 00:08

## 2023-11-25 NOTE — ED ADULT NURSE REASSESSMENT NOTE - NS ED NURSE REASSESS COMMENT FT1
pt given food & fluids for PO challenge; pt able to eat and keep food down; denies nausea, vomiting, abdominal pain; pt verbalizes feeling better

## 2023-11-26 ENCOUNTER — NON-APPOINTMENT (OUTPATIENT)
Age: 73
End: 2023-11-26

## 2023-11-26 DIAGNOSIS — Z20.822 CONTACT WITH AND (SUSPECTED) EXPOSURE TO COVID-19: ICD-10-CM

## 2023-11-26 DIAGNOSIS — K83.8 OTHER SPECIFIED DISEASES OF BILIARY TRACT: ICD-10-CM

## 2023-11-26 DIAGNOSIS — R11.2 NAUSEA WITH VOMITING, UNSPECIFIED: ICD-10-CM

## 2023-11-26 DIAGNOSIS — Z85.3 PERSONAL HISTORY OF MALIGNANT NEOPLASM OF BREAST: ICD-10-CM

## 2023-11-26 DIAGNOSIS — Z98.1 ARTHRODESIS STATUS: ICD-10-CM

## 2023-11-26 DIAGNOSIS — I48.91 UNSPECIFIED ATRIAL FIBRILLATION: ICD-10-CM

## 2023-11-30 ENCOUNTER — NON-APPOINTMENT (OUTPATIENT)
Age: 73
End: 2023-11-30

## 2024-10-11 ENCOUNTER — EMERGENCY (EMERGENCY)
Facility: HOSPITAL | Age: 74
LOS: 1 days | Discharge: HOME | End: 2024-10-11
Admitting: EMERGENCY MEDICINE
Payer: MEDICARE

## 2024-10-11 ENCOUNTER — NON-APPOINTMENT (OUTPATIENT)
Age: 74
End: 2024-10-11

## 2024-10-11 VITALS
HEART RATE: 88 BPM | OXYGEN SATURATION: 96 % | DIASTOLIC BLOOD PRESSURE: 60 MMHG | HEIGHT: 62 IN | SYSTOLIC BLOOD PRESSURE: 124 MMHG | RESPIRATION RATE: 18 BRPM | TEMPERATURE: 97 F | WEIGHT: 130.07 LBS

## 2024-10-11 VITALS
SYSTOLIC BLOOD PRESSURE: 123 MMHG | RESPIRATION RATE: 16 BRPM | OXYGEN SATURATION: 98 % | TEMPERATURE: 98 F | HEART RATE: 71 BPM | DIASTOLIC BLOOD PRESSURE: 72 MMHG

## 2024-10-11 LAB
ALBUMIN SERPL ELPH-MCNC: 3.2 G/DL — LOW (ref 3.4–5)
ALP SERPL-CCNC: 104 U/L — SIGNIFICANT CHANGE UP (ref 40–120)
ALT FLD-CCNC: 21 U/L — SIGNIFICANT CHANGE UP (ref 12–42)
ANION GAP SERPL CALC-SCNC: 8 MMOL/L — LOW (ref 9–16)
APPEARANCE UR: CLEAR — SIGNIFICANT CHANGE UP
AST SERPL-CCNC: 22 U/L — SIGNIFICANT CHANGE UP (ref 15–37)
BACTERIA # UR AUTO: ABNORMAL /HPF
BASOPHILS # BLD AUTO: 0 K/UL — SIGNIFICANT CHANGE UP (ref 0–0.2)
BASOPHILS NFR BLD AUTO: 0 % — SIGNIFICANT CHANGE UP (ref 0–2)
BILIRUB SERPL-MCNC: 0.1 MG/DL — LOW (ref 0.2–1.2)
BILIRUB UR-MCNC: NEGATIVE — SIGNIFICANT CHANGE UP
BUN SERPL-MCNC: 10 MG/DL — SIGNIFICANT CHANGE UP (ref 7–23)
CALCIUM SERPL-MCNC: 9.4 MG/DL — SIGNIFICANT CHANGE UP (ref 8.5–10.5)
CHLORIDE SERPL-SCNC: 105 MMOL/L — SIGNIFICANT CHANGE UP (ref 96–108)
CO2 SERPL-SCNC: 29 MMOL/L — SIGNIFICANT CHANGE UP (ref 22–31)
COD CRY URNS QL: SIGNIFICANT CHANGE UP
COLOR SPEC: YELLOW — SIGNIFICANT CHANGE UP
CREAT SERPL-MCNC: 0.57 MG/DL — SIGNIFICANT CHANGE UP (ref 0.5–1.3)
DIFF PNL FLD: NEGATIVE — SIGNIFICANT CHANGE UP
EGFR: 94 ML/MIN/1.73M2 — SIGNIFICANT CHANGE UP
EOSINOPHIL # BLD AUTO: 0 K/UL — SIGNIFICANT CHANGE UP (ref 0–0.5)
EOSINOPHIL NFR BLD AUTO: 0 % — SIGNIFICANT CHANGE UP (ref 0–6)
EPI CELLS # UR: 5 — SIGNIFICANT CHANGE UP
FLUAV AG NPH QL: SIGNIFICANT CHANGE UP
FLUBV AG NPH QL: SIGNIFICANT CHANGE UP
GLUCOSE SERPL-MCNC: 91 MG/DL — SIGNIFICANT CHANGE UP (ref 70–99)
GLUCOSE UR QL: NEGATIVE MG/DL — SIGNIFICANT CHANGE UP
GRAN CASTS # UR COMP ASSIST: SIGNIFICANT CHANGE UP
HCT VFR BLD CALC: 29.9 % — LOW (ref 34.5–45)
HGB BLD-MCNC: 9.9 G/DL — LOW (ref 11.5–15.5)
HYALINE CASTS # UR AUTO: SIGNIFICANT CHANGE UP
KETONES UR-MCNC: NEGATIVE MG/DL — SIGNIFICANT CHANGE UP
LEUKOCYTE ESTERASE UR-ACNC: ABNORMAL
LYMPHOCYTES # BLD AUTO: 1.91 K/UL — SIGNIFICANT CHANGE UP (ref 1–3.3)
LYMPHOCYTES # BLD AUTO: 16 % — SIGNIFICANT CHANGE UP (ref 13–44)
MACROCYTES BLD QL: SIGNIFICANT CHANGE UP
MAGNESIUM SERPL-MCNC: 1.3 MG/DL — LOW (ref 1.6–2.6)
MANUAL SMEAR VERIFICATION: SIGNIFICANT CHANGE UP
MCHC RBC-ENTMCNC: 32.6 PG — SIGNIFICANT CHANGE UP (ref 27–34)
MCHC RBC-ENTMCNC: 33.1 GM/DL — SIGNIFICANT CHANGE UP (ref 32–36)
MCV RBC AUTO: 98.4 FL — SIGNIFICANT CHANGE UP (ref 80–100)
MONOCYTES # BLD AUTO: 1.07 K/UL — HIGH (ref 0–0.9)
MONOCYTES NFR BLD AUTO: 9 % — SIGNIFICANT CHANGE UP (ref 2–14)
NEUTROPHILS # BLD AUTO: 8.93 K/UL — HIGH (ref 1.8–7.4)
NEUTROPHILS NFR BLD AUTO: 71 % — SIGNIFICANT CHANGE UP (ref 43–77)
NEUTS BAND # BLD: 4 % — SIGNIFICANT CHANGE UP (ref 0–8)
NITRITE UR-MCNC: NEGATIVE — SIGNIFICANT CHANGE UP
NRBC # BLD: 0 /100 WBCS — SIGNIFICANT CHANGE UP (ref 0–0)
NRBC # BLD: SIGNIFICANT CHANGE UP /100 WBCS (ref 0–0)
PH UR: 5.5 — SIGNIFICANT CHANGE UP (ref 5–8)
PLAT MORPH BLD: NORMAL — SIGNIFICANT CHANGE UP
PLATELET # BLD AUTO: 125 K/UL — LOW (ref 150–400)
POLYCHROMASIA BLD QL SMEAR: SLIGHT — SIGNIFICANT CHANGE UP
POTASSIUM SERPL-MCNC: 4.2 MMOL/L — SIGNIFICANT CHANGE UP (ref 3.5–5.3)
POTASSIUM SERPL-SCNC: 4.2 MMOL/L — SIGNIFICANT CHANGE UP (ref 3.5–5.3)
PROT SERPL-MCNC: 7.9 G/DL — SIGNIFICANT CHANGE UP (ref 6.4–8.2)
PROT UR-MCNC: NEGATIVE MG/DL — SIGNIFICANT CHANGE UP
RBC # BLD: 3.04 M/UL — LOW (ref 3.8–5.2)
RBC # FLD: 16.5 % — HIGH (ref 10.3–14.5)
RBC BLD AUTO: ABNORMAL
RBC CASTS # UR COMP ASSIST: 1 /HPF — SIGNIFICANT CHANGE UP (ref 0–4)
RSV RNA NPH QL NAA+NON-PROBE: SIGNIFICANT CHANGE UP
SARS-COV-2 RNA SPEC QL NAA+PROBE: SIGNIFICANT CHANGE UP
SODIUM SERPL-SCNC: 142 MMOL/L — SIGNIFICANT CHANGE UP (ref 132–145)
SP GR SPEC: 1.02 — SIGNIFICANT CHANGE UP (ref 1–1.03)
TRI-PHOS CRY UR QL COMP ASSIST: SIGNIFICANT CHANGE UP
URATE CRY FLD QL MICRO: SIGNIFICANT CHANGE UP
UROBILINOGEN FLD QL: 1 MG/DL — SIGNIFICANT CHANGE UP (ref 0.2–1)
WBC # BLD: 11.91 K/UL — HIGH (ref 3.8–10.5)
WBC # FLD AUTO: 11.91 K/UL — HIGH (ref 3.8–10.5)
WBC UR QL: 8 /HPF — HIGH (ref 0–5)

## 2024-10-11 PROCEDURE — 70450 CT HEAD/BRAIN W/O DYE: CPT | Mod: 26,MC

## 2024-10-11 PROCEDURE — 99285 EMERGENCY DEPT VISIT HI MDM: CPT

## 2024-10-11 RX ORDER — MAGNESIUM SULFATE 500 MG/ML
1 VIAL (ML) INJECTION ONCE
Refills: 0 | Status: COMPLETED | OUTPATIENT
Start: 2024-10-11 | End: 2024-10-11

## 2024-10-11 RX ORDER — TRAMADOL HYDROCHLORIDE 50 MG/1
1 TABLET, COATED ORAL
Qty: 12 | Refills: 0
Start: 2024-10-11 | End: 2024-10-13

## 2024-10-11 RX ORDER — TRAMADOL HYDROCHLORIDE 50 MG/1
25 TABLET, COATED ORAL ONCE
Refills: 0 | Status: DISCONTINUED | OUTPATIENT
Start: 2024-10-11 | End: 2024-10-11

## 2024-10-11 RX ORDER — SODIUM CHLORIDE 0.9 % (FLUSH) 0.9 %
1000 SYRINGE (ML) INJECTION ONCE
Refills: 0 | Status: COMPLETED | OUTPATIENT
Start: 2024-10-11 | End: 2024-10-11

## 2024-10-11 RX ORDER — KETOROLAC TROMETHAMINE 10 MG/1
15 TABLET, FILM COATED ORAL ONCE
Refills: 0 | Status: DISCONTINUED | OUTPATIENT
Start: 2024-10-11 | End: 2024-10-11

## 2024-10-11 RX ORDER — METOCLOPRAMIDE HCL 5 MG
10 TABLET ORAL ONCE
Refills: 0 | Status: COMPLETED | OUTPATIENT
Start: 2024-10-11 | End: 2024-10-11

## 2024-10-11 RX ADMIN — KETOROLAC TROMETHAMINE 15 MILLIGRAM(S): 10 TABLET, FILM COATED ORAL at 18:50

## 2024-10-11 RX ADMIN — Medication 2000 MILLILITER(S): at 18:50

## 2024-10-11 RX ADMIN — TRAMADOL HYDROCHLORIDE 25 MILLIGRAM(S): 50 TABLET, COATED ORAL at 19:32

## 2024-10-11 RX ADMIN — Medication 10 MILLIGRAM(S): at 18:50

## 2024-10-11 RX ADMIN — Medication 100 GRAM(S): at 19:34

## 2024-10-11 NOTE — ED ADULT TRIAGE NOTE - CHIEF COMPLAINT QUOTE
Pt walked in c/o headache and blurred vision x 1 month. 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 Pt was sent in by her pcp.

## 2024-10-11 NOTE — ED ADULT NURSE NOTE - OBJECTIVE STATEMENT
Presents to ED c/o headaches ongoing for 1.5 months, worse today. +light sensitivity and + blurry vision, denies vomiting. Pt reports needs cataract surgery. On chemo for gallbladder cancer, recent session last week. GCS 15.

## 2024-10-11 NOTE — ED PROVIDER NOTE - CLINICAL SUMMARY MEDICAL DECISION MAKING FREE TEXT BOX
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, history of spinal fusion presents with headache with light sensitivity on and off for the last month.  Denies fever, chills, nausea, vomiting.  On exam patient is well-appearing neurovasc intact nonfocal there is tenderness over the forehead ethmoid sinuses and surrounding edema crepitus no edema no erythema.  Labs within normal limits CAT scan shows chronic sinusitis of the ethmoid sinus.  Given Toradol Reglan IV hydration.  Advise follow-up with ENT, neuro

## 2024-10-11 NOTE — ED PROVIDER NOTE - CARE PROVIDER_API CALL
Yoselin Vega  Neurology  [address] [address]  Phone: [phone]  Fax: [phone]  Follow Up Time:     Burak Morataya  Otolaryngology  [address] [place] [address], [address]  [place] [address]  Phone: [phone]  Fax: [phone]  Follow Up Time:

## 2024-10-11 NOTE — ED PROVIDER NOTE - PROGRESS NOTE DETAILS
received s/o from YAMIL Langford pending repletion of Mg due to hypomagnesemia, Pt reassessed at bedside and reports feeling better. AFVSS, neurologically intact, reports back to baseline and tolerating po without N/V, will f/u with her PMD and oncologist

## 2024-10-11 NOTE — ED PROVIDER NOTE - CARE PROVIDERS DIRECT ADDRESSES
[email or web address]

## 2024-10-11 NOTE — ED PROVIDER NOTE - PATIENT PORTAL LINK FT
You can access the FollowMyHealth Patient Portal offered by [facility] by registering at the following website: [email or web address]. By joining [employer]’s FollowMyHealth portal, you will also be able to view your health information using other applications (apps) compatible with our system.

## 2024-10-11 NOTE — ED ADULT NURSE NOTE - NSFALLUNIVINTERV_ED_ALL_ED
Bed/Stretcher in lowest position, wheels locked, appropriate side rails in place/Call bell, personal items and telephone in reach/Instruct patient to call for assistance before getting out of bed/chair/stretcher/Non-slip footwear applied when patient is off stretcher/[place] to call system/Physically safe environment - no spills, clutter or unnecessary equipment/Purposeful proactive rounding/Room/bathroom lighting operational, light cord in reach

## 2024-10-11 NOTE — ED PROVIDER NOTE - NSFOLLOWUPINSTRUCTIONS_ED_ALL_ED_FT
Headache    A headache is pain or discomfort felt around the head or neck area. The specific cause of a headache may not be found as there are many types including tension headaches, migraine headaches, and cluster headaches. Watch your condition for any changes. Things you can do to manage your pain include taking over the counter and prescription medications as instructed by your health care provider, lying down in a dark quiet room, limiting stress, getting regular sleep, and refraining from alcohol and tobacco products.    SEEK IMMEDIATE MEDICAL CARE IF YOU HAVE ANY OF THE FOLLOWING SYMPTOMS: fever, vomiting, stiff neck, loss of vision, problems with speech, muscle weakness, loss of balance, trouble walking, passing out, or confusion. Headache    A headache is pain or discomfort felt around the head or neck area. The specific cause of a headache may not be found as there are many types including tension headaches, migraine headaches, and cluster headaches. Watch your condition for any changes. Things you can do to manage your pain include taking over the counter and prescription medications as instructed by your health care provider, lying down in a dark quiet room, limiting stress, getting regular sleep, and refraining from alcohol and tobacco products.    SEEK IMMEDIATE MEDICAL CARE IF YOU HAVE ANY OF THE FOLLOWING SYMPTOMS: fever, vomiting, stiff neck, loss of vision, problems with speech, muscle weakness, loss of balance, trouble walking, passing out, or confusion.    Hypomagnesemia  Hypomagnesemia is a condition in which the level of magnesium in the blood is too low. Magnesium is a mineral that is found in many foods. It is used in many different processes in the body. Hypomagnesemia can affect every organ in the body. In severe cases, it can cause life-threatening problems.    What are the causes?  This condition may be caused by:  Not getting enough magnesium in your diet or not having enough healthy foods to eat (malnutrition).  Problems with magnesium absorption in the intestines.  Dehydration.  Excessive use of alcohol.  Vomiting.  Severe or long-term (chronic) diarrhea.  Some medicines, including medicines that make you urinate more often (diuretics).  Certain diseases, such as kidney disease, diabetes, celiac disease, and overactive thyroid.  What are the signs or symptoms?  Symptoms of this condition include:  Loss of appetite, nausea, and vomiting.  Involuntary shaking or trembling of a body part (tremor).  Muscle weakness or tingling in the arms and legs.  Sudden tightening of muscles (muscle spasms).  Confusion.  Psychiatric issues, such as:  Depression and irritability.  Psychosis.  A feeling of fluttering of the heart (palpitations).  Seizures.  These symptoms are more severe if magnesium levels drop suddenly.    How is this diagnosed?  This condition may be diagnosed based on:  Your symptoms and medical history.  A physical exam.  Blood and urine tests.  How is this treated?  A sign showing that a person should not drink alcohol.  Treatment depends on the cause and the severity of the condition. It may be treated by:  Taking a magnesium supplement. This can be taken in pill form. If the condition is severe, magnesium is usually given through an IV.  Making changes to your diet. You may be directed to eat foods that have a lot of magnesium, such as green leafy vegetables, peas, beans, and nuts.  Not drinking alcohol. If you are struggling not to drink, ask your health care provider for help.  Follow these instructions at home:  Eating and drinking    Perkins beans.   A bunch of spinach.   Make sure that your diet includes foods with magnesium. Foods that have a lot of magnesium in them include:  Green leafy vegetables, such as spinach and broccoli.  Beans and peas.  Nuts and seeds, such as almonds and sunflower seeds.  Whole grains, such as whole grain bread and fortified cereals.  Drink fluids that contain salts and minerals (electrolytes), such as sports drinks, when you are active.  Do not drink alcohol.  General instructions    Take over-the-counter and prescription medicines only as told by your health care provider.  Take magnesium supplements as directed if your health care provider tells you to take them.  Have your magnesium levels monitored as told by your health care provider.  Keep all follow-up visits. This is important.  Contact a health care provider if:  You get worse instead of better.  Your symptoms return.  Get help right away if:  You develop severe muscle weakness.  You have trouble breathing.  You feel that your heart is racing.  These symptoms may represent a serious problem that is an emergency. Do not wait to see if the symptoms will go away. Get medical help right away. Call your local emergency services (911 in the [place] Do not drive yourself to the hospital.    Summary  Hypomagnesemia is a condition in which the level of magnesium in the blood is too low.  Hypomagnesemia can affect every organ in the body.  Treatment may include eating more foods that contain magnesium, taking magnesium supplements, and not drinking alcohol.  Have your magnesium levels monitored as told by your health care provider.

## 2024-10-11 NOTE — ED PROVIDER NOTE - OBJECTIVE STATEMENT
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.Presents with frontal headache between the eyes as pressure-like associated with photophobia that is been going on and off for the last month.  Denies any fevers denies any chills denies any sweats.  States headache can last hours at times.  Admits to some right sided neck pain at this time but denies any rigidity or worst headache of her life.

## 2024-10-15 DIAGNOSIS — R51.9 HEADACHE, UNSPECIFIED: ICD-10-CM

## 2024-10-15 DIAGNOSIS — Z85.09 PERSONAL HISTORY OF MALIGNANT NEOPLASM OF OTHER DIGESTIVE ORGANS: ICD-10-CM

## 2024-10-19 ENCOUNTER — APPOINTMENT (OUTPATIENT)
Dept: OTOLARYNGOLOGY | Facility: CLINIC | Age: 74
End: 2024-10-19
Payer: MEDICARE

## 2024-10-19 ENCOUNTER — NON-APPOINTMENT (OUTPATIENT)
Age: 74
End: 2024-10-19

## 2024-10-19 VITALS
HEIGHT: 64 IN | OXYGEN SATURATION: 95 % | HEART RATE: 90 BPM | TEMPERATURE: 96.9 F | WEIGHT: 144 LBS | SYSTOLIC BLOOD PRESSURE: 118 MMHG | BODY MASS INDEX: 24.59 KG/M2 | DIASTOLIC BLOOD PRESSURE: 75 MMHG

## 2024-10-19 DIAGNOSIS — C76.2 MALIGNANT NEOPLASM OF ABDOMEN: ICD-10-CM

## 2024-10-19 DIAGNOSIS — J32.9 CHRONIC SINUSITIS, UNSPECIFIED: ICD-10-CM

## 2024-10-19 DIAGNOSIS — J30.89 OTHER ALLERGIC RHINITIS: ICD-10-CM

## 2024-10-19 DIAGNOSIS — J33.9 NASAL POLYP, UNSPECIFIED: ICD-10-CM

## 2024-10-19 PROCEDURE — 31231 NASAL ENDOSCOPY DX: CPT

## 2024-10-19 PROCEDURE — 99204 OFFICE O/P NEW MOD 45 MIN: CPT | Mod: 25

## 2024-10-19 RX ORDER — AMOXICILLIN AND CLAVULANATE POTASSIUM 875; 125 MG/1; MG/1
875-125 TABLET, COATED ORAL
Qty: 14 | Refills: 0 | Status: ACTIVE | COMMUNITY
Start: 2024-10-19 | End: 1900-01-01

## 2024-10-19 RX ORDER — FLUTICASONE PROPIONATE 50 UG/1
50 SPRAY, METERED NASAL
Qty: 3 | Refills: 1 | Status: ACTIVE | COMMUNITY
Start: 2024-10-19 | End: 1900-01-01

## 2024-10-25 ENCOUNTER — OUTPATIENT (OUTPATIENT)
Dept: OUTPATIENT SERVICES | Facility: HOSPITAL | Age: 74
LOS: 1 days | End: 2024-10-25

## 2024-10-25 ENCOUNTER — APPOINTMENT (OUTPATIENT)
Dept: CT IMAGING | Facility: CLINIC | Age: 74
End: 2024-10-25
Payer: MEDICARE

## 2024-10-25 PROCEDURE — 70486 CT MAXILLOFACIAL W/O DYE: CPT | Mod: 26

## 2024-11-08 ENCOUNTER — APPOINTMENT (OUTPATIENT)
Dept: OTOLARYNGOLOGY | Facility: CLINIC | Age: 74
End: 2024-11-08
Payer: MEDICARE

## 2024-11-08 DIAGNOSIS — J30.89 OTHER ALLERGIC RHINITIS: ICD-10-CM

## 2024-11-08 DIAGNOSIS — C76.2 MALIGNANT NEOPLASM OF ABDOMEN: ICD-10-CM

## 2024-11-08 DIAGNOSIS — J32.9 CHRONIC SINUSITIS, UNSPECIFIED: ICD-10-CM

## 2024-11-08 PROCEDURE — G2211 COMPLEX E/M VISIT ADD ON: CPT

## 2024-11-08 PROCEDURE — 99214 OFFICE O/P EST MOD 30 MIN: CPT

## 2024-11-08 RX ORDER — FLUTICASONE PROPIONATE 93 UG/1
93 SPRAY, METERED NASAL
Qty: 1 | Refills: 2 | Status: ACTIVE | COMMUNITY
Start: 2024-11-08 | End: 1900-01-01

## 2024-11-15 RX ORDER — PREDNISONE 50 MG/1
50 TABLET ORAL
Qty: 5 | Refills: 0 | Status: ACTIVE | COMMUNITY
Start: 2024-11-15 | End: 1900-01-01

## 2024-11-22 ENCOUNTER — APPOINTMENT (OUTPATIENT)
Dept: OTOLARYNGOLOGY | Facility: CLINIC | Age: 74
End: 2024-11-22
Payer: MEDICARE

## 2024-11-22 VITALS
HEART RATE: 71 BPM | HEIGHT: 64 IN | TEMPERATURE: 95.6 F | BODY MASS INDEX: 24.59 KG/M2 | OXYGEN SATURATION: 99 % | WEIGHT: 144 LBS | DIASTOLIC BLOOD PRESSURE: 57 MMHG | SYSTOLIC BLOOD PRESSURE: 92 MMHG

## 2024-11-22 DIAGNOSIS — J32.9 CHRONIC SINUSITIS, UNSPECIFIED: ICD-10-CM

## 2024-11-22 DIAGNOSIS — J33.9 NASAL POLYP, UNSPECIFIED: ICD-10-CM

## 2024-11-22 DIAGNOSIS — J30.89 OTHER ALLERGIC RHINITIS: ICD-10-CM

## 2024-11-22 PROCEDURE — 99214 OFFICE O/P EST MOD 30 MIN: CPT | Mod: 25

## 2024-11-22 PROCEDURE — 31231 NASAL ENDOSCOPY DX: CPT

## 2024-11-22 RX ORDER — GABAPENTIN 600 MG/1
600 TABLET, COATED ORAL
Refills: 0 | Status: ACTIVE | COMMUNITY

## 2024-12-04 NOTE — ED ADULT NURSE NOTE - NSFALLRISK_ED_ALL_ED
-- DO NOT REPLY / DO NOT REPLY ALL --  -- This inbox is not monitored. If this was sent to the wrong provider or department, reroute message to P ECO Reroute pool. --  -- Message is from Engagement Center Operations (ECO) --    General Patient Message:             Patient had an mammogram completed on 11/6/2024 and it was listed as a diagnotic  patient would like for the dr to please change the order to normal/standard mammogram.  patient is requesting the Claim to be corrected and resubmitted.  Patient would like  A follow up call once corrected.    Patient is at work and can be reached at  # [phone]      Caller Information       Contact Date/Time Type Contact Phone/Fax    12/04/2024 09:28 AM CST Phone (Incoming) cristy [phone]            Alternative phone number:     Can a detailed message be left? Yes - LiveWell Message  and Yes - Voicemail   Patient has been advised the message will be addressed within 2-3 business days.              Copied from CRM #[id number]. Topic: MW Messaging - MW Patient Request  >> Dec 4, 2024  9:30 AM Codie GASCA wrote:  cristy called requesting to send a general message to clinician.   Verified issue is NOT regarding a symptom(s) requiring routine or emergent triage. Verified another message template type and CRM does not apply.    Selected 'Wrap Up CRM' and created new Telephone Encounter after clicking 'Convert to Clinical Call'. Selected appropriate Reason for Call.  Sent Pt message template and routed as routine priority per Clinician KB page to appropriate clinician pool. Readback full message.   No

## 2025-01-23 ENCOUNTER — APPOINTMENT (OUTPATIENT)
Dept: OTOLARYNGOLOGY | Facility: CLINIC | Age: 75
End: 2025-01-23

## 2025-01-24 ENCOUNTER — APPOINTMENT (OUTPATIENT)
Dept: NEUROLOGY | Facility: CLINIC | Age: 75
End: 2025-01-24

## 2025-02-27 NOTE — ED ADULT NURSE NOTE - NS ED NOTE ABUSE SUSPICION NEGLECT YN
----- Message from Esme Maguire DO sent at 2/26/2025  6:40 PM CST -----  Labs all good, stable. Please continue to have a healthy diet and lifestyle.  If you have any questions, please call the office.  Take Care, Dr. Maguire .    Patient will return for test results   No

## 2025-05-27 NOTE — ED ADULT TRIAGE NOTE - CHIEF COMPLAINT QUOTE
Pt. walk in from  for chest pain and EKG changes (inverted T wave in lead 3, T wave flattening in AVF). C/o L. sided CP under breast, nausea, and fatigue. Denies SOB, vomiting, and dizziness.
No

## 2025-06-01 ENCOUNTER — INPATIENT (INPATIENT)
Facility: HOSPITAL | Age: 75
LOS: 2 days | Discharge: HOME | DRG: 309 | End: 2025-06-04
Attending: INTERNAL MEDICINE | Admitting: INTERNAL MEDICINE
Payer: MEDICARE

## 2025-06-01 VITALS
HEART RATE: 125 BPM | WEIGHT: 136.91 LBS | TEMPERATURE: 98 F | SYSTOLIC BLOOD PRESSURE: 112 MMHG | RESPIRATION RATE: 15 BRPM | DIASTOLIC BLOOD PRESSURE: 63 MMHG | OXYGEN SATURATION: 97 %

## 2025-06-01 DIAGNOSIS — C23 MALIGNANT NEOPLASM OF GALLBLADDER: ICD-10-CM

## 2025-06-01 DIAGNOSIS — Z98.890 OTHER SPECIFIED POSTPROCEDURAL STATES: Chronic | ICD-10-CM

## 2025-06-01 DIAGNOSIS — I48.91 UNSPECIFIED ATRIAL FIBRILLATION: ICD-10-CM

## 2025-06-01 DIAGNOSIS — C50.919 MALIGNANT NEOPLASM OF UNSPECIFIED SITE OF UNSPECIFIED FEMALE BREAST: ICD-10-CM

## 2025-06-01 DIAGNOSIS — R05.9 COUGH, UNSPECIFIED: ICD-10-CM

## 2025-06-01 LAB
ALBUMIN SERPL ELPH-MCNC: 2.1 G/DL — LOW (ref 3.4–5)
ALP SERPL-CCNC: 50 U/L — SIGNIFICANT CHANGE UP (ref 40–120)
ALT FLD-CCNC: 27 U/L — SIGNIFICANT CHANGE UP (ref 12–42)
ANION GAP SERPL CALC-SCNC: 8 MMOL/L — LOW (ref 9–16)
ANION GAP SERPL CALC-SCNC: 8 MMOL/L — SIGNIFICANT CHANGE UP (ref 5–17)
APPEARANCE UR: CLEAR — SIGNIFICANT CHANGE UP
APTT BLD: 27 SEC — SIGNIFICANT CHANGE UP (ref 26.1–36.8)
AST SERPL-CCNC: 25 U/L — SIGNIFICANT CHANGE UP (ref 15–37)
BASOPHILS # BLD AUTO: 0.02 K/UL — SIGNIFICANT CHANGE UP (ref 0–0.2)
BASOPHILS NFR BLD AUTO: 0.2 % — SIGNIFICANT CHANGE UP (ref 0–2)
BILIRUB SERPL-MCNC: 0.6 MG/DL — SIGNIFICANT CHANGE UP (ref 0.2–1.2)
BILIRUB UR-MCNC: NEGATIVE — SIGNIFICANT CHANGE UP
BUN SERPL-MCNC: 22 MG/DL — SIGNIFICANT CHANGE UP (ref 7–23)
BUN SERPL-MCNC: 24 MG/DL — HIGH (ref 7–23)
CALCIUM SERPL-MCNC: 6.7 MG/DL — LOW (ref 8.5–10.5)
CALCIUM SERPL-MCNC: 8.7 MG/DL — SIGNIFICANT CHANGE UP (ref 8.4–10.5)
CHLORIDE SERPL-SCNC: 104 MMOL/L — SIGNIFICANT CHANGE UP (ref 96–108)
CHLORIDE SERPL-SCNC: 106 MMOL/L — SIGNIFICANT CHANGE UP (ref 96–108)
CO2 SERPL-SCNC: 22 MMOL/L — SIGNIFICANT CHANGE UP (ref 22–31)
CO2 SERPL-SCNC: 22 MMOL/L — SIGNIFICANT CHANGE UP (ref 22–31)
COLOR SPEC: YELLOW — SIGNIFICANT CHANGE UP
CREAT SERPL-MCNC: 0.83 MG/DL — SIGNIFICANT CHANGE UP (ref 0.5–1.3)
CREAT SERPL-MCNC: 0.9 MG/DL — SIGNIFICANT CHANGE UP (ref 0.5–1.3)
DIFF PNL FLD: ABNORMAL
EGFR: 66 ML/MIN/1.73M2 — SIGNIFICANT CHANGE UP
EGFR: 66 ML/MIN/1.73M2 — SIGNIFICANT CHANGE UP
EGFR: 73 ML/MIN/1.73M2 — SIGNIFICANT CHANGE UP
EGFR: 73 ML/MIN/1.73M2 — SIGNIFICANT CHANGE UP
EOSINOPHIL # BLD AUTO: 0.01 K/UL — SIGNIFICANT CHANGE UP (ref 0–0.5)
EOSINOPHIL NFR BLD AUTO: 0.1 % — SIGNIFICANT CHANGE UP (ref 0–6)
FLUAV AG NPH QL: SIGNIFICANT CHANGE UP
FLUBV AG NPH QL: SIGNIFICANT CHANGE UP
GLUCOSE SERPL-MCNC: 103 MG/DL — HIGH (ref 70–99)
GLUCOSE SERPL-MCNC: 96 MG/DL — SIGNIFICANT CHANGE UP (ref 70–99)
GLUCOSE UR QL: NEGATIVE MG/DL — SIGNIFICANT CHANGE UP
HCT VFR BLD CALC: 39.6 % — SIGNIFICANT CHANGE UP (ref 34.5–45)
HGB BLD-MCNC: 13.1 G/DL — SIGNIFICANT CHANGE UP (ref 11.5–15.5)
IMM GRANULOCYTES # BLD AUTO: 0.02 K/UL — SIGNIFICANT CHANGE UP (ref 0–0.07)
IMM GRANULOCYTES NFR BLD AUTO: 0.2 % — SIGNIFICANT CHANGE UP (ref 0–0.9)
INR BLD: 1.31 — HIGH (ref 0.85–1.16)
KETONES UR QL: NEGATIVE MG/DL — SIGNIFICANT CHANGE UP
LACTATE BLDV-MCNC: 1.6 MMOL/L — SIGNIFICANT CHANGE UP (ref 0.5–2)
LEUKOCYTE ESTERASE UR-ACNC: ABNORMAL
LYMPHOCYTES # BLD AUTO: 0.82 K/UL — LOW (ref 1–3.3)
LYMPHOCYTES NFR BLD AUTO: 9.8 % — LOW (ref 13–44)
MCHC RBC-ENTMCNC: 28.9 PG — SIGNIFICANT CHANGE UP (ref 27–34)
MCHC RBC-ENTMCNC: 33.1 G/DL — SIGNIFICANT CHANGE UP (ref 32–36)
MCV RBC AUTO: 87.2 FL — SIGNIFICANT CHANGE UP (ref 80–100)
MONOCYTES # BLD AUTO: 1.97 K/UL — HIGH (ref 0–0.9)
MONOCYTES NFR BLD AUTO: 23.4 % — HIGH (ref 2–14)
NEUTROPHILS # BLD AUTO: 5.57 K/UL — SIGNIFICANT CHANGE UP (ref 1.8–7.4)
NEUTROPHILS NFR BLD AUTO: 66.3 % — SIGNIFICANT CHANGE UP (ref 43–77)
NITRITE UR-MCNC: NEGATIVE — SIGNIFICANT CHANGE UP
NRBC # BLD AUTO: 0 K/UL — SIGNIFICANT CHANGE UP (ref 0–0)
NRBC # FLD: 0 K/UL — SIGNIFICANT CHANGE UP (ref 0–0)
NRBC BLD AUTO-RTO: 0 /100 WBCS — SIGNIFICANT CHANGE UP (ref 0–0)
NT-PROBNP SERPL-SCNC: 3383 PG/ML — HIGH
PH UR: 6 — SIGNIFICANT CHANGE UP (ref 5–8)
PLATELET # BLD AUTO: 143 K/UL — LOW (ref 150–400)
PMV BLD: 10.5 FL — SIGNIFICANT CHANGE UP (ref 7–13)
POTASSIUM SERPL-MCNC: 3 MMOL/L — LOW (ref 3.5–5.3)
POTASSIUM SERPL-MCNC: 5 MMOL/L — SIGNIFICANT CHANGE UP (ref 3.5–5.3)
POTASSIUM SERPL-SCNC: 3 MMOL/L — LOW (ref 3.5–5.3)
POTASSIUM SERPL-SCNC: 5 MMOL/L — SIGNIFICANT CHANGE UP (ref 3.5–5.3)
PROT SERPL-MCNC: 6 G/DL — LOW (ref 6.4–8.2)
PROT UR-MCNC: 30 MG/DL
PROTHROM AB SERPL-ACNC: 15.2 SEC — HIGH (ref 9.9–13.4)
RBC # BLD: 4.54 M/UL — SIGNIFICANT CHANGE UP (ref 3.8–5.2)
RBC # FLD: 14.8 % — HIGH (ref 10.3–14.5)
RSV RNA NPH QL NAA+NON-PROBE: SIGNIFICANT CHANGE UP
SARS-COV-2 RNA SPEC QL NAA+PROBE: SIGNIFICANT CHANGE UP
SODIUM SERPL-SCNC: 134 MMOL/L — LOW (ref 135–145)
SODIUM SERPL-SCNC: 136 MMOL/L — SIGNIFICANT CHANGE UP (ref 132–145)
SOURCE RESPIRATORY: SIGNIFICANT CHANGE UP
SP GR SPEC: 1.05 — HIGH (ref 1–1.03)
TROPONIN I, HIGH SENSITIVITY RESULT: 20.7 NG/L — SIGNIFICANT CHANGE UP
UROBILINOGEN FLD QL: 1 MG/DL — SIGNIFICANT CHANGE UP (ref 0.2–1)
WBC # BLD: 8.41 K/UL — SIGNIFICANT CHANGE UP (ref 3.8–10.5)
WBC # FLD AUTO: 8.41 K/UL — SIGNIFICANT CHANGE UP (ref 3.8–10.5)

## 2025-06-01 PROCEDURE — 71045 X-RAY EXAM CHEST 1 VIEW: CPT | Mod: 26

## 2025-06-01 PROCEDURE — 99223 1ST HOSP IP/OBS HIGH 75: CPT

## 2025-06-01 PROCEDURE — 99285 EMERGENCY DEPT VISIT HI MDM: CPT

## 2025-06-01 PROCEDURE — 71275 CT ANGIOGRAPHY CHEST: CPT | Mod: 26

## 2025-06-01 RX ORDER — METOPROLOL SUCCINATE 50 MG/1
12.5 TABLET, EXTENDED RELEASE ORAL
Refills: 0 | Status: DISCONTINUED | OUTPATIENT
Start: 2025-06-01 | End: 2025-06-02

## 2025-06-01 RX ORDER — GABAPENTIN 400 MG/1
1 CAPSULE ORAL
Refills: 0 | DISCHARGE

## 2025-06-01 RX ORDER — APIXABAN 2.5 MG/1
5 TABLET, FILM COATED ORAL EVERY 12 HOURS
Refills: 0 | Status: DISCONTINUED | OUTPATIENT
Start: 2025-06-02 | End: 2025-06-04

## 2025-06-01 RX ORDER — DEXTROMETHORPHAN HBR, GUAIFENESIN 200 MG/10ML
600 LIQUID ORAL EVERY 12 HOURS
Refills: 0 | Status: DISCONTINUED | OUTPATIENT
Start: 2025-06-01 | End: 2025-06-04

## 2025-06-01 RX ORDER — EXEMESTANE 25 MG/1
1 TABLET, FILM COATED ORAL
Refills: 0 | DISCHARGE

## 2025-06-01 RX ORDER — ACETAMINOPHEN 500 MG/5ML
650 LIQUID (ML) ORAL ONCE
Refills: 0 | Status: COMPLETED | OUTPATIENT
Start: 2025-06-01 | End: 2025-06-01

## 2025-06-01 RX ORDER — GABAPENTIN 400 MG/1
600 CAPSULE ORAL THREE TIMES A DAY
Refills: 0 | Status: DISCONTINUED | OUTPATIENT
Start: 2025-06-01 | End: 2025-06-04

## 2025-06-01 RX ORDER — FLUTICASONE PROPIONATE 50 UG/1
1 SPRAY, METERED NASAL
Refills: 0 | DISCHARGE

## 2025-06-01 RX ORDER — APIXABAN 2.5 MG/1
5 TABLET, FILM COATED ORAL ONCE
Refills: 0 | Status: COMPLETED | OUTPATIENT
Start: 2025-06-01 | End: 2025-06-01

## 2025-06-01 RX ORDER — GABAPENTIN 400 MG/1
600 CAPSULE ORAL ONCE
Refills: 0 | Status: COMPLETED | OUTPATIENT
Start: 2025-06-01 | End: 2025-06-01

## 2025-06-01 RX ORDER — ONDANSETRON HCL/PF 4 MG/2 ML
4 VIAL (ML) INJECTION ONCE
Refills: 0 | Status: COMPLETED | OUTPATIENT
Start: 2025-06-01 | End: 2025-06-01

## 2025-06-01 RX ORDER — ACETAMINOPHEN 500 MG/5ML
975 LIQUID (ML) ORAL ONCE
Refills: 0 | Status: COMPLETED | OUTPATIENT
Start: 2025-06-01 | End: 2025-06-01

## 2025-06-01 RX ORDER — ONDANSETRON HCL/PF 4 MG/2 ML
1 VIAL (ML) INJECTION
Refills: 0 | DISCHARGE

## 2025-06-01 RX ADMIN — Medication 50 MILLIEQUIVALENT(S): at 22:48

## 2025-06-01 RX ADMIN — GABAPENTIN 600 MILLIGRAM(S): 400 CAPSULE ORAL at 14:12

## 2025-06-01 RX ADMIN — Medication 4 MILLIGRAM(S): at 11:06

## 2025-06-01 RX ADMIN — METOPROLOL SUCCINATE 12.5 MILLIGRAM(S): 50 TABLET, EXTENDED RELEASE ORAL at 18:15

## 2025-06-01 RX ADMIN — Medication 975 MILLIGRAM(S): at 10:25

## 2025-06-01 RX ADMIN — Medication 20 MILLIEQUIVALENT(S): at 18:15

## 2025-06-01 RX ADMIN — Medication 650 MILLIGRAM(S): at 22:48

## 2025-06-01 RX ADMIN — APIXABAN 5 MILLIGRAM(S): 2.5 TABLET, FILM COATED ORAL at 13:59

## 2025-06-01 RX ADMIN — Medication 1000 MILLILITER(S): at 10:36

## 2025-06-01 RX ADMIN — Medication 1000 MILLILITER(S): at 16:14

## 2025-06-01 RX ADMIN — Medication 100 MILLILITER(S): at 18:18

## 2025-06-01 RX ADMIN — Medication 50 MILLIEQUIVALENT(S): at 18:23

## 2025-06-01 RX ADMIN — GABAPENTIN 600 MILLIGRAM(S): 400 CAPSULE ORAL at 22:48

## 2025-06-01 RX ADMIN — DEXTROMETHORPHAN HBR, GUAIFENESIN 600 MILLIGRAM(S): 200 LIQUID ORAL at 19:14

## 2025-06-01 RX ADMIN — Medication 50 MILLIEQUIVALENT(S): at 20:00

## 2025-06-01 NOTE — H&P ADULT - CONVERSATION DETAILS
GOC discussed with patient.  Discussion included but not limited to who is Health Care Proxy, Code status, MOLST, diagnosis, prognosis, treatment options as best as possible.  Per Patient’s wishes She confirmed Patient to be FULL CODE.

## 2025-06-01 NOTE — ED PROVIDER NOTE - PR
[FreeTextEntry1] : Here for follow up. Gained weight he attrib to dietary indiscretion and lack of exercise. No CP or SOB. Taking meds regularly. Recent labs reviewed notable for inc HgbA1c to 6.4
none

## 2025-06-01 NOTE — ED PROVIDER NOTE - OBJECTIVE STATEMENT
75-year-old female with a history of gallbladder cancer status postcholecystectomy with known metastases to stomach on immunotherapy since January, underwent chemo for the prior 2 years, breast cancer status postlumpectomy on oral hormone targeted chemotherapy, scoliosis status post spinal fusion in 2023 that was complicated by blood clots for which patient was on anticoagulation for several months following procedure but no longer on anticoagulation, denies history of arrhythmia/atrial fibrillation or any known cardiac disease, presents with productive cough, generalized fatigue/malaise, nausea and several episodes of watery brown diarrhea for the past several days.  Today while in the toilet had a near syncopal episode.  No loss of consciousness, falls or head strike.  In the ED only complaining of profound global weakness, no pain/shortness of breath/palpitations/chest pain, despite a heart rate in the 120s to 150s.  No urinary symptoms.  No documented fever.

## 2025-06-01 NOTE — ED ADULT NURSE REASSESSMENT NOTE - NS ED NURSE REASSESS COMMENT FT1
break coverage for abigail hoyt, transport here for this lady, report given to floor, introduced self to patient, noted po K+ at bedside, pt refused stating "I cant swallow those" I changed order to not given and called receiving abigail Singh to inform her med not given

## 2025-06-01 NOTE — H&P ADULT - ASSESSMENT
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 fib and blood clots, on Eliquis x3-4months following procedure but no longer taking, s/p IVC filter placement), presented to [facility] after pre-syncopal event this morning. Patient states that for the past 3-4 days she's been extremely weak, with no appetite, dizziness and poor PO intake. EKG on arrival revealed AFib RVR, infectious workup negative, CT PE negative, s/p 1L NS w/ improvement of sxs. Admitted to cardiology for further management of Afib RVR, starting Lopressor 12.5mg PO BID and Eliquis 5mg PO BID, continuing gentle hydration.

## 2025-06-01 NOTE — H&P ADULT - NSHPLABSRESULTS_GEN_ALL_CORE
13.1   8.41  )-----------( 143      ( 2025 10:20 )             39.6       06-    136  |  106  |  22  ----------------------------<  96  3.0[L]   |  22  |  0.83    Ca    6.7[L]      2025 10:20    TPro  6.0[L]  /  Alb  2.1[L]  /  TBili  0.6  /  DBili  x   /  AST  25  /  ALT  27  /  AlkPhos  50  06-01    PT/INR - ( 2025 10:20 )   PT: 15.2 sec;   INR: 1.31        PTT - ( 2025 10:20 )  PTT:27.0 sec    Urinalysis Basic - ( 2025 10:20 )    Color: Yellow / Appearance: Clear / S.054 / pH: x  Gluc: 96 mg/dL / Ketone: x  / Bili: Negative / Urobili: 1.0 mg/dL   Blood: x / Protein: 30 mg/dL / Nitrite: Negative   Leuk Esterase: Trace / RBC: 5 /HPF / WBC 2 /HPF   Sq Epi: x / Non Sq Epi: x / Bacteria: Few /HPF

## 2025-06-01 NOTE — H&P ADULT - PROBLEM SELECTOR PLAN 4
S/p cholecystectomy and chemo x2 years  - Mets to stomach  - On immunomodulator since 01/2025    F: NS 100cc x6hr  E: Replete if K<4 or Mag<2  N: DASH Diet  VTEppx: Eliquis  CODE: FULL  Dispo: pending clinical course  PT: to be seen

## 2025-06-01 NOTE — ED PROVIDER NOTE - CLINICAL SUMMARY MEDICAL DECISION MAKING FREE TEXT BOX
New onset atrial fibrillation with RVR, rate improved with 1 L normal saline.  Negative workup for ACS.  CTA chest negative for PE.  No significant pulmonary edema or signs of fluid overload.  Patient was hypokalemic to 3.0, repleted with 40 mEq orally.  Patient blood pressure is stable in the emergency department but patient is still significantly fatigued and lightheaded upon standing.  Will admit to cardiology service at [facility] for further workup and optimization.

## 2025-06-01 NOTE — ED ADULT TRIAGE NOTE - CHIEF COMPLAINT QUOTE
Pt BIBA from home, pt with generalized weakness, productive cough, chills and 1 episode of diarrhea x3 days. Pt has not had any measured fevers. Pt actively receiving immunotherapy and chemo for gallbladder cancer, last treatment 1 week ago. Pt tachy in triage. Prehospital finger stick 141.

## 2025-06-01 NOTE — H&P ADULT - PROBLEM SELECTOR PLAN 2
Pt presents with non-productive cough x several days, states she doesn't have enough strength to clear the mucous, afebrile, no WBC, 100% on RA  - CXR (6/1/25): The heart and lungs are normal. There is a right-sided Mediport. Spinal fusion surgery.  - RVP (6/1/25): negative  - C/w Mucinex 600mg PO BID  - Continue to monitor off antibiotics Pt presents with non-productive cough x several days, states she doesn't have enough strength to clear mucous, afebrile, no WBC, 100% on RA, lungs CTA b/l  - CXR (6/1/25): The heart and lungs are normal. There is a right-sided Mediport. Spinal fusion surgery.  - RVP (6/1/25): Negative  - C/w Mucinex 600mg PO BID  - Continue to monitor off antibiotics

## 2025-06-01 NOTE — ED ADULT NURSE NOTE - BEFAST SPEECH SLURRED
[facility]  [address]  [address] [address]  [phone] phone     [phone] fax  Hours of Operation: Monday- Friday 8:00am- 5:00pm  After hours phone  [phone]  Hematology / Oncology Physicians on call      Dr. Tristan Gardiner    Please call with any concerns regarding your appointment today.FALL PREVENTION   Falls often occur due to slipping, tripping or losing your balance. Here are ways to reduce your risk of falling again.   Was there anything that caused your fall that can be fixed, removed or replaced?   Make your home safe by keeping walkways clear of objects you may trip over.   Use non-slip pads under rugs.   Do not walk in poorly lit areas.   Do not stand on chairs or wobbly ladders.   Use caution when reaching overhead or looking upward. This position can cause a loss of balance.   Be sure your shoes fit properly, have non-slip bottoms and are in good condition.   Be cautious when going up and down stairs, curbs, and when walking on uneven sidewalks.   If your balance is poor, consider using a cane or walker.   If your fall was related to alcohol use, stop or limit alcohol intake.   If your fall was related to use of sleeping medicines, talk to your doctor about this. You may need to reduce your dosage at bedtime if you awaken during the night to go to the bathroom.   To reduce the need for nighttime bathroom trips:   Avoid drinking fluids for several hours before going to bed   Empty your bladder before going to bed   Men can keep a urinal at the bedside   © 7416-7180 [employer], [address] [place] [address], [place] [address]. All rights reserved. This information is not intended as a substitute for professional medical care. Always follow your healthcare professional's instructions.  HOME CARE AFTER CHEMOTHERAPY   Meals   Many patients feel sick and lose their appetites during treatment. Eat small meals several  times a day. Choose bland foods with little taste or smell if you have problems with nausea. Be sure to cook all food thoroughly. This kills bacteria and helps you avoid intestinal infection. Soft foods are easier to swallow and digest.   Activity   Exercise keeps you strong and keeps your heart and lungs active. Talk to your doctor about an appropriate exercise program for you.   Skin Care   To prevent a skin infection, bathe or shower once a day. Use a moisturizing soap and wash with warm water. Avoid very hot or cold water. Chemotherapy can make your skin dry . Apply moisturizing lotion to help relieve dry skin. Some drugs used in high doses can cause slight burns to appear (like sunburn). Ask for a special cream to help relieve the burn and protect your skin.   Prevent Mouth Sores   During chemotherapy, many people get mouth sores. Do the following to help prevent mouth sores or to ease discomfort.   Brush your teeth with a soft-bristle toothbrush after every meal.  Don't use dental floss if your platelet count is below 50,000. Your doctor or nurse will tell you if this is the case.  Use an oral swab or special soft toothbrush if your gums bleed during regular brushing.  Use mouthwash as directed. If you can't tolerate commercial mouthwash, use salt and baking soda to clean your mouth. Mix 1 teaspoon of salt and 1 teaspoon of baking soda into a glass of water. Swish and spit.  Call your doctor or return to this facility if you develop any of the following:   Sore throat   White patches in the mouth or throat   Fever of 100.4ºF (38ºC) or higher, or as directed by your healthcare provider  © 8435-5211 [employer], [address] [place] [address], [place] [address]. All rights reserved. This information is not intended as a substitute for professional medical care. Always follow your healthcare professional's   WAYS TO HELP PREVENT INFECTION         WASH YOUR HANDS OFTEN DURING THE DAY, ESPECIALLY BEFORE YOU EAT, AFTER  USING THE BATHROOM, AND AFTER TOUCHING ANIMALS     STAY AWAY FROM PEOPLE WHO HAVE ILLNESSES YOU CAN CATCH; SUCH AS COLDS, FLU, CHICKEN POX     TRY TO AVOID CROWDS     STAY AWAY FROM CHILDREN WHO RECENTLY HAVE RECEIVED LIVE VIRUS VACCINES     MAINTAIN GOOD MOUTH CARE     DO NOT SQUEEZE OR SCRATCH PIMPLES     CLEAN CUTS & SCRAPES RIGHT AWAY AND DAILY UNTIL HEALED WITH WARM WATER, SOAP & AN ANTISEPTIC     AVOID CONTACT WITH LITTER BOXES, BIRD CAGES, & FISH TANKS     AVOID STANDING WATER, IE., BIRD BATHS, FLOWER POTS/VASES, OR HUMIDIFIERS     WEAR GLOVES WHEN GARDENING OR CLEANING UP AFTER OTHERS, ESPECIALLY BABIES & SMALL CHILDREN     DO NOT EAT RAW FISH, SEAFOOD, MEAT, OR EGGS   No

## 2025-06-01 NOTE — H&P ADULT - PROBLEM SELECTOR PLAN 3
On Exemestane 25mg PO QD (hormonal neuromodulator)  - S/p lumpectomy with multiple revisions  - Call outpt onc in AM to confirm med rec

## 2025-06-01 NOTE — H&P ADULT - PROBLEM SELECTOR PLAN 1
Presented w/ globalized weakness, HR 90-140s  - H/o A. fib 1x after spinal surgery in 2023  - S/p 1L NS with improvement of HR to low 100s  - Rate: starting Lopressor 12.5mg PO BID  - AC: starting Eliquis 5mg PO BID  - F/u TTE in AM Presented w/ globalized weakness, HR 90-140s  - EKG: Afib RVR 129bpm, non-ischemic  - H/o A. fib 1x after spinal surgery in 2023  - Infectious workup negative  - S/p 1L NS with improvement of HR to low 100s  - Rate: starting Lopressor 12.5mg PO BID  - AC: starting Eliquis 5mg PO BID  - F/u TTE in AM

## 2025-06-01 NOTE — H&P ADULT - HISTORY OF PRESENT ILLNESS
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 fib and blood clots, on AC x3-4months following procedure but no longer taking), presented to [facility] after pre-syncopal event this morning. Patient states that for the past 3-4 days she's been extremely weak, with no appetite, dizziness and poor PO intake. Additionally reports non-productive cough x several days, endorses subjective fever at the start of her symptoms, however has been afebrile in hospital. States she developed watery diarrhea this morning. While on toilet, she almost passed out, witnessed by her sister who states she did not fully lose consciousness and did not hit her head. Patient reports feeling like her blood pressure was low, states this has happened once prior where she passed out from low BP. Denies history of arrhythmia/atrial fibrillation or any known cardiac disease, denies pain/shortness of breath/palpitations/chest pain, no urinary symptoms, denies bleeding.    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 fib and blood clots, on Eliquis x3-4months following procedure but no longer taking), presented to [facility] after pre-syncopal event this morning. Patient states that for the past 3-4 days she's been extremely weak, with no appetite, dizziness and poor PO intake. Additionally reports non-productive cough x several days, endorses subjective fever at the start of her symptoms, however has been afebrile in hospital. States she developed watery diarrhea this morning. While on toilet, she almost passed out, witnessed by her sister who states she did not fully lose consciousness and did not hit her head. Reports this has happened once prior where she passed out from low BP. Additionally reports constant L sided chest pressure since her breast revision, noted it was more prominent today. Additionally had an episode of palpitations overnight. Denies history of arrhythmia/atrial fibrillation or any known cardiac disease, currently denies shortness of breath/palpitations/chest pain, denies urinary symptoms, denies bleeding, denies exposure to sick contacts.    In ED:  Vitals: T 98.3, HR 125bpm, /63mmHg, RR 15, SpO2 97% RA  Significant labs: WBC 8.41, Hgb 13.1, Hct 39.6, Plt 143, Na 136, K 3.0, Cl 106, CO2 22, BUN 22, Cr 0.83, BNP 3383, Lactate 1.6, Trop I 20.7  RVP and UA negative  EKG: Afib RVR 129bpm, non-ischemic  CXR: The heart and lungs are normal. There is a right-sided Mediport. Spinal fusion surgery.  CT PE: no pulmonary emboli  Interventions: Eliquis 5mg PO x1, NS 1L, Tylenol 975mg x1, Gabapentin 600mg PO x1, Zofran 4mg IVP x1    Admitted to cardiology for further management of Afib RVR. 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 fib and blood clots, on Eliquis x3-4months following procedure but no longer taking, s/p IVC filter placement), presented to [facility] after pre-syncopal event this morning. Patient states that for the past 3-4 days she's been extremely weak, with no appetite, dizziness and poor PO intake. Additionally reports non-productive cough x several days, endorses subjective fever at the start of her symptoms, however has been afebrile in hospital. States she developed watery diarrhea this morning. While on toilet, she almost passed out, witnessed by her sister who states she did not fully lose consciousness and did not hit her head. Reports this has happened once prior where she passed out from low BP. Additionally reports constant L sided chest pressure since her breast revision, noted it was more prominent today. Additionally had an episode of palpitations overnight. Denies history of arrhythmia/atrial fibrillation or any known cardiac disease, currently denies shortness of breath/palpitations/chest pain, denies urinary symptoms, denies bleeding, denies exposure to sick contacts.    In ED:  Vitals: T 98.3, HR 125bpm, /63mmHg, RR 15, SpO2 97% RA  Significant labs: WBC 8.41, Hgb 13.1, Hct 39.6, Plt 143, Na 136, K 3.0, Cl 106, CO2 22, BUN 22, Cr 0.83, BNP 3383, Lactate 1.6, Trop I 20.7  RVP and UA negative  EKG: Afib RVR 129bpm, non-ischemic  CXR: The heart and lungs are normal. There is a right-sided Mediport. Spinal fusion surgery.  CT PE: no pulmonary emboli  Interventions: Eliquis 5mg PO x1, NS 1L, Tylenol 975mg x1, Gabapentin 600mg PO x1, Zofran 4mg IVP x1    Admitted to cardiology for further management of Afib RVR. 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 fib and blood clots, on Eliquis x3-4months following procedure but no longer taking, s/p IVC filter placement), presented to [facility] after pre-syncopal event this morning. Patient states that for the past 3-4 days she's been extremely weak, with no appetite, dizziness and poor PO intake. Additionally reports non-productive cough x several days, endorses subjective fever at the start of her symptoms, however has been afebrile in hospital. States she developed watery diarrhea this morning. While on toilet, she almost passed out, witnessed by her sister who states she did not fully lose consciousness and did not hit her head. Reports this has happened once prior where she passed out from low BP. Additionally reports constant L sided chest pressure since her breast revision, noted it was more prominent today. Additionally had an episode of palpitations overnight. Denies history of arrhythmia/atrial fibrillation or any known cardiac disease, currently denies shortness of breath/palpitations/chest pain, denies urinary symptoms, denies bleeding, denies exposure to sick contacts.    In ED:  Vitals: T 98.3, HR 125bpm, /63mmHg, RR 15, SpO2 97% RA  Significant labs: WBC 8.41, Hgb 13.1, Hct 39.6, Plt 143, Na 136, K 3.0, Cl 106, CO2 22, BUN 22, Cr 0.83, BNP 3383, Lactate 1.6, Trop I 20.7  RVP and UA negative  EKG: Afib RVR 129bpm, non-ischemic  CXR: The heart and lungs are normal. There is a right-sided Mediport. Spinal fusion surgery.  CT PE: no pulmonary emboli  Interventions: Eliquis 5mg PO x1, NS 1L, Tylenol 975mg x1, Gabapentin 600mg PO x1, Zofran 4mg IVP x1    Admitted to cardiology for further management of Afib RVR.

## 2025-06-02 ENCOUNTER — RESULT REVIEW (OUTPATIENT)
Age: 75
End: 2025-06-02

## 2025-06-02 DIAGNOSIS — R19.7 DIARRHEA, UNSPECIFIED: ICD-10-CM

## 2025-06-02 LAB
A1C WITH ESTIMATED AVERAGE GLUCOSE RESULT: 5.5 % — SIGNIFICANT CHANGE UP (ref 4–5.6)
ALBUMIN SERPL ELPH-MCNC: 3.2 G/DL — LOW (ref 3.3–5)
ALP SERPL-CCNC: 58 U/L — SIGNIFICANT CHANGE UP (ref 40–120)
ALT FLD-CCNC: 25 U/L — SIGNIFICANT CHANGE UP (ref 10–45)
ANION GAP SERPL CALC-SCNC: 12 MMOL/L — SIGNIFICANT CHANGE UP (ref 5–17)
AST SERPL-CCNC: 20 U/L — SIGNIFICANT CHANGE UP (ref 10–40)
BILIRUB SERPL-MCNC: 0.4 MG/DL — SIGNIFICANT CHANGE UP (ref 0.2–1.2)
BUN SERPL-MCNC: 18 MG/DL — SIGNIFICANT CHANGE UP (ref 7–23)
CALCIUM SERPL-MCNC: 9 MG/DL — SIGNIFICANT CHANGE UP (ref 8.4–10.5)
CHLORIDE SERPL-SCNC: 104 MMOL/L — SIGNIFICANT CHANGE UP (ref 96–108)
CHOLEST SERPL-MCNC: 141 MG/DL — SIGNIFICANT CHANGE UP
CO2 SERPL-SCNC: 20 MMOL/L — LOW (ref 22–31)
CREAT SERPL-MCNC: 0.78 MG/DL — SIGNIFICANT CHANGE UP (ref 0.5–1.3)
EGFR: 78 ML/MIN/1.73M2 — SIGNIFICANT CHANGE UP
EGFR: 78 ML/MIN/1.73M2 — SIGNIFICANT CHANGE UP
ESTIMATED AVERAGE GLUCOSE: 111 MG/DL — SIGNIFICANT CHANGE UP (ref 68–114)
FERRITIN SERPL-MCNC: 416 NG/ML — HIGH (ref 13–330)
GLUCOSE SERPL-MCNC: 158 MG/DL — HIGH (ref 70–99)
HCT VFR BLD CALC: 34.2 % — LOW (ref 34.5–45)
HDLC SERPL-MCNC: 33 MG/DL — LOW
HGB BLD-MCNC: 11.5 G/DL — SIGNIFICANT CHANGE UP (ref 11.5–15.5)
IRON SATN MFR SERPL: 28 % — SIGNIFICANT CHANGE UP (ref 14–50)
IRON SATN MFR SERPL: 65 UG/DL — SIGNIFICANT CHANGE UP (ref 30–160)
LDLC SERPL-MCNC: 87 MG/DL — SIGNIFICANT CHANGE UP
LIPID PNL WITH DIRECT LDL SERPL: 87 MG/DL — SIGNIFICANT CHANGE UP
MAGNESIUM SERPL-MCNC: 1.8 MG/DL — SIGNIFICANT CHANGE UP (ref 1.6–2.6)
MCHC RBC-ENTMCNC: 29.6 PG — SIGNIFICANT CHANGE UP (ref 27–34)
MCHC RBC-ENTMCNC: 33.6 G/DL — SIGNIFICANT CHANGE UP (ref 32–36)
MCV RBC AUTO: 88.1 FL — SIGNIFICANT CHANGE UP (ref 80–100)
NONHDLC SERPL-MCNC: 108 MG/DL — SIGNIFICANT CHANGE UP
NRBC BLD AUTO-RTO: 0 /100 WBCS — SIGNIFICANT CHANGE UP (ref 0–0)
PLATELET # BLD AUTO: 144 K/UL — LOW (ref 150–400)
POTASSIUM SERPL-MCNC: 4.1 MMOL/L — SIGNIFICANT CHANGE UP (ref 3.5–5.3)
POTASSIUM SERPL-SCNC: 4.1 MMOL/L — SIGNIFICANT CHANGE UP (ref 3.5–5.3)
PROT SERPL-MCNC: 7 G/DL — SIGNIFICANT CHANGE UP (ref 6–8.3)
RBC # BLD: 3.88 M/UL — SIGNIFICANT CHANGE UP (ref 3.8–5.2)
RBC # FLD: 14.8 % — HIGH (ref 10.3–14.5)
SODIUM SERPL-SCNC: 136 MMOL/L — SIGNIFICANT CHANGE UP (ref 135–145)
T4 AB SER-ACNC: 9.54 UG/DL — SIGNIFICANT CHANGE UP (ref 4.5–11.7)
TIBC SERPL-MCNC: 234 UG/DL — SIGNIFICANT CHANGE UP (ref 220–430)
TRANSFERRIN SERPL-MCNC: 194 MG/DL — LOW (ref 200–360)
TRIGL SERPL-MCNC: 117 MG/DL — SIGNIFICANT CHANGE UP
TROPONIN T, HIGH SENSITIVITY RESULT: 14 NG/L — SIGNIFICANT CHANGE UP (ref 0–51)
TSH SERPL-MCNC: 2.15 UIU/ML — SIGNIFICANT CHANGE UP (ref 0.27–4.2)
UIBC SERPL-MCNC: 169 UG/DL — SIGNIFICANT CHANGE UP (ref 110–370)
WBC # BLD: 5.52 K/UL — SIGNIFICANT CHANGE UP (ref 3.8–10.5)
WBC # FLD AUTO: 5.52 K/UL — SIGNIFICANT CHANGE UP (ref 3.8–10.5)

## 2025-06-02 PROCEDURE — 93010 ELECTROCARDIOGRAM REPORT: CPT

## 2025-06-02 PROCEDURE — 93306 TTE W/DOPPLER COMPLETE: CPT | Mod: 26

## 2025-06-02 PROCEDURE — 99233 SBSQ HOSP IP/OBS HIGH 50: CPT

## 2025-06-02 RX ORDER — AMIODARONE HYDROCHLORIDE 50 MG/ML
150 INJECTION, SOLUTION INTRAVENOUS ONCE
Refills: 0 | Status: COMPLETED | OUTPATIENT
Start: 2025-06-02 | End: 2025-06-02

## 2025-06-02 RX ORDER — MELATONIN 5 MG
5 TABLET ORAL AT BEDTIME
Refills: 0 | Status: DISCONTINUED | OUTPATIENT
Start: 2025-06-02 | End: 2025-06-04

## 2025-06-02 RX ORDER — METOPROLOL SUCCINATE 50 MG/1
50 TABLET, EXTENDED RELEASE ORAL THREE TIMES A DAY
Refills: 0 | Status: DISCONTINUED | OUTPATIENT
Start: 2025-06-02 | End: 2025-06-04

## 2025-06-02 RX ORDER — MAGNESIUM OXIDE 400 MG
800 TABLET ORAL ONCE
Refills: 0 | Status: COMPLETED | OUTPATIENT
Start: 2025-06-02 | End: 2025-06-02

## 2025-06-02 RX ORDER — METOPROLOL SUCCINATE 50 MG/1
50 TABLET, EXTENDED RELEASE ORAL ONCE
Refills: 0 | Status: COMPLETED | OUTPATIENT
Start: 2025-06-02 | End: 2025-06-02

## 2025-06-02 RX ORDER — EXEMESTANE 25 MG/1
25 TABLET, FILM COATED ORAL DAILY
Refills: 0 | Status: DISCONTINUED | OUTPATIENT
Start: 2025-06-02 | End: 2025-06-04

## 2025-06-02 RX ORDER — LACTOBACILLUS ACIDOPHILUS/PECT 75 MM-100
1 CAPSULE ORAL DAILY
Refills: 0 | Status: DISCONTINUED | OUTPATIENT
Start: 2025-06-02 | End: 2025-06-04

## 2025-06-02 RX ORDER — ACETAMINOPHEN 500 MG/5ML
650 LIQUID (ML) ORAL ONCE
Refills: 0 | Status: COMPLETED | OUTPATIENT
Start: 2025-06-02 | End: 2025-06-02

## 2025-06-02 RX ORDER — FLUTICASONE PROPIONATE 50 UG/1
1 SPRAY, METERED NASAL
Refills: 0 | Status: DISCONTINUED | OUTPATIENT
Start: 2025-06-02 | End: 2025-06-04

## 2025-06-02 RX ADMIN — Medication 650 MILLIGRAM(S): at 10:02

## 2025-06-02 RX ADMIN — GABAPENTIN 600 MILLIGRAM(S): 400 CAPSULE ORAL at 21:42

## 2025-06-02 RX ADMIN — DEXTROMETHORPHAN HBR, GUAIFENESIN 600 MILLIGRAM(S): 200 LIQUID ORAL at 05:59

## 2025-06-02 RX ADMIN — DEXTROMETHORPHAN HBR, GUAIFENESIN 600 MILLIGRAM(S): 200 LIQUID ORAL at 18:29

## 2025-06-02 RX ADMIN — Medication 800 MILLIGRAM(S): at 16:47

## 2025-06-02 RX ADMIN — Medication 100 MILLILITER(S): at 10:03

## 2025-06-02 RX ADMIN — FLUTICASONE PROPIONATE 1 SPRAY(S): 50 SPRAY, METERED NASAL at 18:29

## 2025-06-02 RX ADMIN — APIXABAN 5 MILLIGRAM(S): 2.5 TABLET, FILM COATED ORAL at 13:48

## 2025-06-02 RX ADMIN — Medication 1 TABLET(S): at 18:29

## 2025-06-02 RX ADMIN — METOPROLOL SUCCINATE 50 MILLIGRAM(S): 50 TABLET, EXTENDED RELEASE ORAL at 16:47

## 2025-06-02 RX ADMIN — GABAPENTIN 600 MILLIGRAM(S): 400 CAPSULE ORAL at 13:48

## 2025-06-02 RX ADMIN — AMIODARONE HYDROCHLORIDE 200 MILLIGRAM(S): 50 INJECTION, SOLUTION INTRAVENOUS at 13:51

## 2025-06-02 RX ADMIN — EXEMESTANE 25 MILLIGRAM(S): 25 TABLET, FILM COATED ORAL at 13:49

## 2025-06-02 RX ADMIN — APIXABAN 5 MILLIGRAM(S): 2.5 TABLET, FILM COATED ORAL at 01:06

## 2025-06-02 RX ADMIN — METOPROLOL SUCCINATE 12.5 MILLIGRAM(S): 50 TABLET, EXTENDED RELEASE ORAL at 06:00

## 2025-06-02 RX ADMIN — Medication 650 MILLIGRAM(S): at 11:02

## 2025-06-02 RX ADMIN — GABAPENTIN 600 MILLIGRAM(S): 400 CAPSULE ORAL at 05:59

## 2025-06-02 RX ADMIN — METOPROLOL SUCCINATE 50 MILLIGRAM(S): 50 TABLET, EXTENDED RELEASE ORAL at 21:42

## 2025-06-02 RX ADMIN — Medication 5 MILLIGRAM(S): at 21:42

## 2025-06-02 NOTE — PROGRESS NOTE ADULT - PROBLEM SELECTOR PLAN 3
Pt presents with non-productive cough x several days, states she doesn't have enough strength to clear mucous, afebrile, no WBC, 100% on RA, lungs CTA b/l  - CXR (6/1/25): The heart and lungs are normal. There is a right-sided Mediport. Spinal fusion surgery.  - RVP (6/1/25): Negative  - C/w Mucinex 600mg PO BID  - Continue to monitor off antibiotics. Pt presents with non-productive cough x several days, afebrile, no WBC, 100% on RA, lungs CTA b/l  - CXR (6/1/25): The heart and lungs are normal. There is a right-sided Mediport. Spinal fusion surgery.  - RVP (6/1/25): Negative  - C/w Mucinex 600mg PO BID  - Continue to monitor off antibiotics

## 2025-06-02 NOTE — CONSULT NOTE ADULT - SUBJECTIVE AND OBJECTIVE BOX
HPI:    75 year old female with history of gallbladder CA s/p cholecystectomy, now on immunotherapy x1 a week last dose 05/29,     PAST MEDICAL & SURGICAL HISTORY:  Gallbladder cancer  Breast cancer  Scoliosis    S/P cholecystectomy  H/O lumpectomy    No pertinent family history in first degree relatives        Social History:no smoking, no drugs, no algohol    pertinent home medications:    Inpatient Medications:   apixaban 5 milliGRAM(s) Oral every 12 hours  exemestane 25 milliGRAM(s) Oral daily  fluticasone propionate 50 MICROgram(s)/spray Nasal Spray 1 Spray(s) Both Nostrils two times a day  gabapentin 600 milliGRAM(s) Oral three times a day  guaiFENesin  milliGRAM(s) Oral every 12 hours  metoprolol tartrate 12.5 milliGRAM(s) Oral two times a day  sodium chloride 0.9%. 1000 milliLiter(s) IV Continuous <Continuous>      Allergies: No Known Allergies      ROS:   CONSTITUTIONAL: No fever, weight loss + fatigue  EYES: Pt denies  RESPIRATORY: No cough, wheezing, chills or hemoptysis; No Shortness of Breath  CARDIOVASCULAR: see HPI  GASTROINTESTINAL: Pt denies  NEUROLOGICAL: Pt denies  SKIN: Pt denies   PSYCHIATRIC: Pt denies  HEME/LYMPH: Pt denies    PHYSICAL:  T(C): 36.6 (06-02-25 @ 09:03), Max: 37.1 (06-01-25 @ 20:35)  HR: 78 (06-02-25 @ 09:03) (78 - 102)  BP: 138/94 (06-02-25 @ 09:03) (98/57 - 150/69)  RR: 18 (06-02-25 @ 09:03) (15 - 18)  SpO2: 97% (06-02-25 @ 09:03) (97% - 100%)  Wt(kg): --  Appearance: No acute distress, well developed  Eyes: normal appearing conjunctiva, pupils and eyelids  Cardiovascular: Normal S1 S2, No JVD, No murmurs, No edema  Respiratory: Lungs clear to auscultation	bilaterally.  No wheeze, rhonchi, rales noted  Gastrointestinal:  Soft, NT/ND 	  Neurologic:  No deficit noted  Psych: A&Ox3, normal mood/affect  Musculoskeletal: normal gait  Skin: no rash noted, normal color and pigmentation.        LABS:                        13.1   8.41  )-----------( 143      ( 01 Jun 2025 10:20 )             39.6     06-01    134[L]  |  104  |  24[H]  ----------------------------<  103[H]  5.0   |  22  |  0.90    Ca    8.7      01 Jun 2025 22:00    TPro  6.0[L]  /  Alb  2.1[L]  /  TBili  0.6  /  DBili  x   /  AST  25  /  ALT  27  /  AlkPhos  50  06-01    PT/INR - ( 01 Jun 2025 10:20 )   PT: 15.2 sec;   INR: 1.31          PTT - ( 01 Jun 2025 10:20 )  PTT:27.0 sec  TSH  Troponin    EKG:    Telemetry:    ECHO:    Prior EP procedures:    Cath / stress / Cardiac CTa:    Assessment Plan:         HPI:    75 year old female with history of gallbladder CA s/p cholecystectomy, now on immunotherapy x1 a week last dose 05/29, breast CA L sided s/p lumpectomy, scoliosis s/p spinal fusion c/b an episode of AF and blood clots (was on eliquis for a few months, since DC but s/p IVC filter), who presented with weakness, lack of appetite, cough, dizziness, and diarrhea. GI PCR /c.diff pending.     EP consulted for management of AFib.  On presentation patient was in AF 120s. team started the patient on lopressor 12.5, and AC with eliquis. consulted for possible DORIE DCCV pending her GI workup.     PAST MEDICAL & SURGICAL HISTORY:  Gallbladder cancer  Breast cancer  Scoliosis    S/P cholecystectomy  H/O lumpectomy    No pertinent family history in first degree relatives        Social History:no smoking, no drugs, no etoh     pertinent home medications:    Inpatient Medications:   apixaban 5 milliGRAM(s) Oral every 12 hours  exemestane 25 milliGRAM(s) Oral daily  fluticasone propionate 50 MICROgram(s)/spray Nasal Spray 1 Spray(s) Both Nostrils two times a day  gabapentin 600 milliGRAM(s) Oral three times a day  guaiFENesin  milliGRAM(s) Oral every 12 hours  metoprolol tartrate 12.5 milliGRAM(s) Oral two times a day  sodium chloride 0.9%. 1000 milliLiter(s) IV Continuous <Continuous>      Allergies: No Known Allergies      ROS:   CONSTITUTIONAL: No fever, weight loss + fatigue  EYES: Pt denies  RESPIRATORY: No cough, wheezing, chills or hemoptysis; No Shortness of Breath  CARDIOVASCULAR: see HPI  GASTROINTESTINAL: Pt denies  NEUROLOGICAL: Pt denies  SKIN: Pt denies   PSYCHIATRIC: Pt denies  HEME/LYMPH: Pt denies    PHYSICAL:  T(C): 36.6 (06-02-25 @ 09:03), Max: 37.1 (06-01-25 @ 20:35)  HR: 78 (06-02-25 @ 09:03) (78 - 102)  BP: 138/94 (06-02-25 @ 09:03) (98/57 - 150/69)  RR: 18 (06-02-25 @ 09:03) (15 - 18)  SpO2: 97% (06-02-25 @ 09:03) (97% - 100%)  Wt(kg): --  Appearance: No acute distress, well developed  Eyes: normal appearing conjunctiva, pupils and eyelids  Cardiovascular: Normal S1 S2, No JVD, No murmurs, No edema  Respiratory: Lungs clear to auscultation	bilaterally.  No wheeze, rhonchi, rales noted  Gastrointestinal:  Soft, NT/ND 	  Neurologic:  No deficit noted  Psych: A&Ox3, normal mood/affect  Musculoskeletal: normal gait  Skin: no rash noted, normal color and pigmentation.        LABS:                        13.1   8.41  )-----------( 143      ( 01 Jun 2025 10:20 )             39.6     06-01    134[L]  |  104  |  24[H]  ----------------------------<  103[H]  5.0   |  22  |  0.90    Ca    8.7      01 Jun 2025 22:00    TPro  6.0[L]  /  Alb  2.1[L]  /  TBili  0.6  /  DBili  x   /  AST  25  /  ALT  27  /  AlkPhos  50  06-01    PT/INR - ( 01 Jun 2025 10:20 )   PT: 15.2 sec;   INR: 1.31          PTT - ( 01 Jun 2025 10:20 )  PTT:27.0 sec      ECHO:    1. Left ventricular cavity is small. Left ventricular wall thickness is normal. Left ventricular systolic function is normal with an ejection fraction of 63 % by Walker's method of disks. There are no regional wall motion abnormalities seen.   2. Normal right ventricular cavity size, with normal wall thickness, and normal right ventricular systolic function.   3. Normal left and right atrial size.   4. No significant valvular disease.   5. Estimated pulmonary artery systolic pressure is 24 mmHg, consistent with normal pulmonary artery pressure.   6. No pericardial effusion seen.   7. No prior echocardiogram is available for comparison.      Assessment Plan:  75 year old female with history of gallbladder CA s/p cholecystectomy, now on immunotherapy x1 a week last dose 05/29, breast CA L sided s/p lumpectomy, scoliosis s/p spinal fusion c/b an episode of AF and blood clots (was on eliquis for a few months, since DC but s/p IVC filter), who presented with weakness, lack of appetite, cough, dizziness, and diarrhea. GI PCR /c.diff pending. EP consulted for management of AFib with RVR      -patient BP high-normal. would give more AV mary ellen blocking agents. can consider uptitrating metoprolol to 50 q8hr, and can transition to 75mg PO q8hr if BP still WNL  -do not recommend initiation of amiodarone given unknown duration of afib   -agree with anticoagulation, monitor h/h  -f/u infectious workup. plan for DORIE DCCV when infectious issues resolve.

## 2025-06-02 NOTE — PROGRESS NOTE ADULT - ASSESSMENT
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 fib and blood clots, on Eliquis x3-4months following procedure but no longer taking, s/p IVC filter placement), presented to [facility] after pre-syncopal event this morning. Patient states that for the past 3-4 days she's been extremely weak, with no appetite, dizziness and poor PO intake. EKG on arrival revealed AFib RVR, infectious workup negative, CT PE negative, s/p 1L NS w/ improvement of sxs. Admitted to cardiology for further management of Afib RVR, starting Lopressor 12.5mg PO BID and Eliquis 5mg PO BID, continuing gentle hydration. F/u TTE and GI panel. 74yo F with PMHx gallbladder cancer (s/p cholecystectomy w/ mets to stomach on immunotherapy since 01/2025, s/p chemo x2 years), breast cancer (s/p L lumpectomy and multiple revisions, on hormonal modulator Exemestane), scoliosis (s/p spinal fusion 2023 c/b A. fib and blood clots, on Eliquis x3-4months following procedure but no longer taking, s/p IVC filter placement), presented to [facility] after pre-syncopal event this morning. Patient states that for the past 3-4 days she's been extremely weak, with no appetite, dizziness and poor PO intake. EKG on arrival revealed AFib RVR, infectious workup negative, CT PE negative, s/p 1L NS w/ improvement of sxs. Admitted to cardiology for further management of Afib RVR, starting Lopressor 12.5mg PO BID and Eliquis 5mg PO BID, continuing gentle hydration. F/u TTE and GI panel. EP consulted for possible DORIE/DCCV tomorrow AM.

## 2025-06-02 NOTE — PROGRESS NOTE ADULT - PROBLEM SELECTOR PLAN 5
S/p cholecystectomy and chemo x2 years  - Mets to stomach  - On immunomodulator since 01/2025    F: NS 100cc x6hr  E: Replete if K<4 or Mag<2  N: DASH Diet  VTEppx: Eliquis  CODE: FULL  Dispo: pending clinical course  PT: to be seen. 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

## 2025-06-02 NOTE — PROGRESS NOTE ADULT - SUBJECTIVE AND OBJECTIVE BOX
Interventional Cardiology PA Adult Progress Note    Subjective Assessment: INCOMPLETE    ROS Negative except as per Subjective and HPI  	  MEDICATIONS:  metoprolol tartrate 12.5 milliGRAM(s) Oral two times a day  guaiFENesin  milliGRAM(s) Oral every 12 hours  gabapentin 600 milliGRAM(s) Oral three times a day  apixaban 5 milliGRAM(s) Oral every 12 hours  sodium chloride 0.9%. 1000 milliLiter(s) IV Continuous <Continuous>    [PHYSICAL EXAM:  TELEMETRY:  T(C): 36.7 (06-02-25 @ 05:45), Max: 37.1 (06-01-25 @ 20:35)  HR: 95 (06-02-25 @ 05:45) (95 - 125)  BP: 114/73 (06-02-25 @ 05:45) (98/57 - 150/69)  RR: 16 (06-02-25 @ 05:45) (15 - 16)  SpO2: 99% (06-02-25 @ 05:45) (97% - 100%)  Wt(kg): --  I&O's Summary    01 Jun 2025 07:01  -  02 Jun 2025 07:00  --------------------------------------------------------  IN: 500 mL / OUT: 0 mL / NET: 500 mL      Height (cm): 157.5 (06-01 @ 18:38)  Weight (kg): 62.1 (06-01 @ 18:38)  BMI (kg/m2): 25 (06-01 @ 18:38)  BSA (m2): 1.63 (06-01 @ 18:38)                                      Appearance: Normal	  HEENT:   Normal oral mucosa, PERRL, EOMI	  Neck: Supple, + JVD/ - JVD; Carotid Bruit   Cardiovascular: Normal S1 S2, No murmurs  Respiratory: Lungs clear to auscultation/Decreased Breath Sounds/No Rales, Rhonchi, Wheezing	  Gastrointestinal:  Soft, Non-tender  Skin: No rashes, No ecchymoses, No cyanosis  Extremities: Normal range of motion, No clubbing, cyanosis or edema  Vascular: Peripheral pulses palpable 2+ bilaterally  Neurologic: Non-focal  Psychiatry: A & O x 3, Mood & affect appropriate	    LABS:	 	  CARDIAC MARKERS:               13.1   8.41  )-----------( 143      ( 01 Jun 2025 10:20 )             39.6     06-01    134[L]  |  104  |  24[H]  ----------------------------<  103[H]  5.0   |  22  |  0.90    Ca    8.7      01 Jun 2025 22:00    TPro  6.0[L]  /  Alb  2.1[L]  /  TBili  0.6  /  DBili  x   /  AST  25  /  ALT  27  /  AlkPhos  50  06-01    PT/INR - ( 01 Jun 2025 10:20 )   PT: 15.2 sec;   INR: 1.31       PTT - ( 01 Jun 2025 10:20 )  PTT:27.0 sec   Interventional Cardiology PA Adult Progress Note    Subjective Assessment: Patient seen and examined at bedside this morning. States she feels better today than yesterday. Reports her cough is improving though still bothering her. Additionally endorses diarrhea stating she immediately has the urge to go to the bathroom after eating. Reports associated nausea and gas. States this has happened 1x prior after her cholecystectomy. Denies any recent changes in her diet. Denies any blood in her stool. Denies CP, SOB, vomiting, palpitations.    ROS Negative except as per Subjective and HPI  	  MEDICATIONS:  metoprolol tartrate 12.5 milliGRAM(s) Oral two times a day  guaiFENesin  milliGRAM(s) Oral every 12 hours  gabapentin 600 milliGRAM(s) Oral three times a day  apixaban 5 milliGRAM(s) Oral every 12 hours  sodium chloride 0.9%. 1000 milliLiter(s) IV Continuous <Continuous>    [PHYSICAL EXAM:  TELEMETRY:  T(C): 36.7 (06-02-25 @ 05:45), Max: 37.1 (06-01-25 @ 20:35)  HR: 95 (06-02-25 @ 05:45) (95 - 125)  BP: 114/73 (06-02-25 @ 05:45) (98/57 - 150/69)  RR: 16 (06-02-25 @ 05:45) (15 - 16)  SpO2: 99% (06-02-25 @ 05:45) (97% - 100%)  Wt(kg): --  I&O's Summary    01 Jun 2025 07:01  -  02 Jun 2025 07:00  --------------------------------------------------------  IN: 500 mL / OUT: 0 mL / NET: 500 mL    Height (cm): 157.5 (06-01 @ 18:38)  Weight (kg): 62.1 (06-01 @ 18:38)  BMI (kg/m2): 25 (06-01 @ 18:38)  BSA (m2): 1.63 (06-01 @ 18:38)                                      Appearance: Normal	  HEENT: Normal oral mucosa, PERRL, EOMI	  Neck: Supple, - JVD; Carotid Bruit   Cardiovascular: Normal S1 S2, No murmurs. Tachycardic, Irregularly irregular.  Respiratory: Lungs clear to auscultation. No Rales, Rhonchi, Wheezing	  Gastrointestinal: Soft, Mildly tender to umbilical region (chronic per patient), Non-distended.  Skin: No rashes, No ecchymoses, No cyanosis.  Extremities: Normal range of motion, No clubbing, cyanosis or edema  Vascular: Peripheral pulses palpable 2+ bilaterally  Neurologic: Non-focal  Psychiatry: A & O x3, Mood & affect appropriate	    LABS:	 	  CARDIAC MARKERS:               13.1   8.41  )-----------( 143      ( 01 Jun 2025 10:20 )             39.6     06-01    134[L]  |  104  |  24[H]  ----------------------------<  103[H]  5.0   |  22  |  0.90    Ca    8.7      01 Jun 2025 22:00    TPro  6.0[L]  /  Alb  2.1[L]  /  TBili  0.6  /  DBili  x   /  AST  25  /  ALT  27  /  AlkPhos  50  06-01    PT/INR - ( 01 Jun 2025 10:20 )   PT: 15.2 sec;   INR: 1.31       PTT - ( 01 Jun 2025 10:20 )  PTT:27.0 sec

## 2025-06-02 NOTE — PROGRESS NOTE ADULT - PROBLEM SELECTOR PLAN 1
Presented w/ globalized weakness, HR 90-140s  - EKG: Afib RVR 129bpm, non-ischemic  - H/o A. fib 1x after spinal surgery in 2023  - Infectious workup negative  - S/p 1L NS with improvement of HR to low 100s, c/w gentle hydration  - Rate: Lopressor 12.5mg PO in AM and 25mg PO in PM (per EP)  - AC: Eliquis 5mg PO BID  - F/u TTE in AM.  - EP following likely DORIE/DCCV tomorrow Presented w/ globalized weakness, HR 90-140s  - EKG: Afib RVR 129bpm, non-ischemic  - H/o A. fib 1x after spinal surgery in 2023  - Infectious workup negative  - S/p 1L NS with improvement of HR to low 100s, c/w gentle hydration  - Rate: Lopressor 12.5mg PO in AM and 25mg PO in PM (per EP)  - AC: Eliquis 5mg PO BID  - F/u TTE  - EP following likely DORIE/DCCV tomorrow Presented w/ globalized weakness, HR 90-140s  - EKG: Afib RVR 129bpm, non-ischemic  - H/o A. fib 1x after spinal surgery in 2023  - Infectious workup negative  - S/p 1L NS with improvement of HR to low 100s, c/w gentle hydration  - Rate: Lopressor 12.5mg PO in AM and 25mg PO in PM (per EP)  - AC: Eliquis 5mg PO BID  - F/u TTE  - F/u TFTs and iron studies  - EP following likely DORIE/DCCV tomorrow Presented w/ globalized weakness, HR 90-140s  - EKG: Afib RVR 129bpm, non-ischemic  - H/o A. fib 1x after spinal surgery in 2023  - Infectious workup negative  - S/p 1L NS with improvement of HR to low 100s, c/w gentle hydration  - Rate: increase to Lopressor 50mg PO Q8h  - AC: Eliquis 5mg PO BID  - TTE (6/2/25): LVEF 63%. No RWMA seen. Normal RV size, with normal wall thickness, and normal RVEF. Normal LA/RA size. No significant valvular disease. Estimated PASP is 24 mmHg, c/w normal pulmonary artery pressure. No pericardial effusion seen.  - TFTs (WNL) and iron studies w/ elevated ferritin and decreased transferrin  - EP, DORIE/DCCV following infectious workup    **Of note, pt w/ episode of -160s while sitting up to eat lunch 6/2, developed blurry vision, dizziness, 6/10 CP, , EKG unchanged, trop negative, symptoms improved when pt laid back down in bed, s/p Amio 150mg IVPx1,  HR maintained in afib 110s

## 2025-06-03 ENCOUNTER — TRANSCRIPTION ENCOUNTER (OUTPATIENT)
Age: 75
End: 2025-06-03

## 2025-06-03 ENCOUNTER — RESULT REVIEW (OUTPATIENT)
Age: 75
End: 2025-06-03

## 2025-06-03 LAB
ALBUMIN SERPL ELPH-MCNC: 3.3 G/DL — SIGNIFICANT CHANGE UP (ref 3.3–5)
ALP SERPL-CCNC: 59 U/L — SIGNIFICANT CHANGE UP (ref 40–120)
ALT FLD-CCNC: 26 U/L — SIGNIFICANT CHANGE UP (ref 10–45)
ANION GAP SERPL CALC-SCNC: 12 MMOL/L — SIGNIFICANT CHANGE UP (ref 5–17)
AST SERPL-CCNC: 22 U/L — SIGNIFICANT CHANGE UP (ref 10–40)
BILIRUB SERPL-MCNC: 0.5 MG/DL — SIGNIFICANT CHANGE UP (ref 0.2–1.2)
BUN SERPL-MCNC: 15 MG/DL — SIGNIFICANT CHANGE UP (ref 7–23)
CALCIUM SERPL-MCNC: 9.5 MG/DL — SIGNIFICANT CHANGE UP (ref 8.4–10.5)
CHLORIDE SERPL-SCNC: 105 MMOL/L — SIGNIFICANT CHANGE UP (ref 96–108)
CO2 SERPL-SCNC: 21 MMOL/L — LOW (ref 22–31)
CREAT SERPL-MCNC: 0.85 MG/DL — SIGNIFICANT CHANGE UP (ref 0.5–1.3)
EGFR: 71 ML/MIN/1.73M2 — SIGNIFICANT CHANGE UP
EGFR: 71 ML/MIN/1.73M2 — SIGNIFICANT CHANGE UP
FOLATE SERPL-MCNC: >20 NG/ML — SIGNIFICANT CHANGE UP
GLUCOSE SERPL-MCNC: 89 MG/DL — SIGNIFICANT CHANGE UP (ref 70–99)
HCT VFR BLD CALC: 35.5 % — SIGNIFICANT CHANGE UP (ref 34.5–45)
HGB BLD-MCNC: 12 G/DL — SIGNIFICANT CHANGE UP (ref 11.5–15.5)
MAGNESIUM SERPL-MCNC: 1.8 MG/DL — SIGNIFICANT CHANGE UP (ref 1.6–2.6)
MCHC RBC-ENTMCNC: 30.1 PG — SIGNIFICANT CHANGE UP (ref 27–34)
MCHC RBC-ENTMCNC: 33.8 G/DL — SIGNIFICANT CHANGE UP (ref 32–36)
MCV RBC AUTO: 89 FL — SIGNIFICANT CHANGE UP (ref 80–100)
NRBC BLD AUTO-RTO: 0 /100 WBCS — SIGNIFICANT CHANGE UP (ref 0–0)
PLATELET # BLD AUTO: 178 K/UL — SIGNIFICANT CHANGE UP (ref 150–400)
POTASSIUM SERPL-MCNC: 4.3 MMOL/L — SIGNIFICANT CHANGE UP (ref 3.5–5.3)
POTASSIUM SERPL-SCNC: 4.3 MMOL/L — SIGNIFICANT CHANGE UP (ref 3.5–5.3)
PROT SERPL-MCNC: 7.6 G/DL — SIGNIFICANT CHANGE UP (ref 6–8.3)
RBC # BLD: 3.99 M/UL — SIGNIFICANT CHANGE UP (ref 3.8–5.2)
RBC # FLD: 14.8 % — HIGH (ref 10.3–14.5)
SODIUM SERPL-SCNC: 138 MMOL/L — SIGNIFICANT CHANGE UP (ref 135–145)
T3 SERPL-MCNC: 114 NG/DL — SIGNIFICANT CHANGE UP (ref 80–200)
VIT B12 SERPL-MCNC: 996 PG/ML — SIGNIFICANT CHANGE UP (ref 232–1245)
WBC # BLD: 5.56 K/UL — SIGNIFICANT CHANGE UP (ref 3.8–10.5)
WBC # FLD AUTO: 5.56 K/UL — SIGNIFICANT CHANGE UP (ref 3.8–10.5)

## 2025-06-03 PROCEDURE — 99233 SBSQ HOSP IP/OBS HIGH 50: CPT

## 2025-06-03 PROCEDURE — 92960 CARDIOVERSION ELECTRIC EXT: CPT

## 2025-06-03 PROCEDURE — 93312 ECHO TRANSESOPHAGEAL: CPT | Mod: 26

## 2025-06-03 PROCEDURE — 93320 DOPPLER ECHO COMPLETE: CPT | Mod: 26

## 2025-06-03 RX ORDER — ACETAMINOPHEN 500 MG/5ML
650 LIQUID (ML) ORAL EVERY 6 HOURS
Refills: 0 | Status: DISCONTINUED | OUTPATIENT
Start: 2025-06-03 | End: 2025-06-04

## 2025-06-03 RX ORDER — MAGNESIUM SULFATE 500 MG/ML
1 SYRINGE (ML) INJECTION ONCE
Refills: 0 | Status: COMPLETED | OUTPATIENT
Start: 2025-06-03 | End: 2025-06-03

## 2025-06-03 RX ADMIN — Medication 100 GRAM(S): at 10:52

## 2025-06-03 RX ADMIN — DEXTROMETHORPHAN HBR, GUAIFENESIN 600 MILLIGRAM(S): 200 LIQUID ORAL at 19:03

## 2025-06-03 RX ADMIN — EXEMESTANE 25 MILLIGRAM(S): 25 TABLET, FILM COATED ORAL at 11:58

## 2025-06-03 RX ADMIN — FLUTICASONE PROPIONATE 1 SPRAY(S): 50 SPRAY, METERED NASAL at 19:03

## 2025-06-03 RX ADMIN — Medication 650 MILLIGRAM(S): at 22:24

## 2025-06-03 RX ADMIN — GABAPENTIN 600 MILLIGRAM(S): 400 CAPSULE ORAL at 05:53

## 2025-06-03 RX ADMIN — APIXABAN 5 MILLIGRAM(S): 2.5 TABLET, FILM COATED ORAL at 13:38

## 2025-06-03 RX ADMIN — GABAPENTIN 600 MILLIGRAM(S): 400 CAPSULE ORAL at 13:38

## 2025-06-03 RX ADMIN — FLUTICASONE PROPIONATE 1 SPRAY(S): 50 SPRAY, METERED NASAL at 05:53

## 2025-06-03 RX ADMIN — Medication 5 MILLIGRAM(S): at 21:24

## 2025-06-03 RX ADMIN — GABAPENTIN 600 MILLIGRAM(S): 400 CAPSULE ORAL at 21:23

## 2025-06-03 RX ADMIN — DEXTROMETHORPHAN HBR, GUAIFENESIN 600 MILLIGRAM(S): 200 LIQUID ORAL at 05:53

## 2025-06-03 RX ADMIN — Medication 1 TABLET(S): at 11:58

## 2025-06-03 RX ADMIN — Medication 650 MILLIGRAM(S): at 21:24

## 2025-06-03 RX ADMIN — METOPROLOL SUCCINATE 50 MILLIGRAM(S): 50 TABLET, EXTENDED RELEASE ORAL at 21:23

## 2025-06-03 RX ADMIN — APIXABAN 5 MILLIGRAM(S): 2.5 TABLET, FILM COATED ORAL at 01:10

## 2025-06-03 NOTE — PROGRESS NOTE ADULT - SUBJECTIVE AND OBJECTIVE BOX
Cardiology NP Adult Progress Note    ****INCOMPLETE     SUBJECTIVE ASSESSMENT:  	  MEDICATIONS:  metoprolol tartrate 50 milliGRAM(s) Oral three times a day  guaiFENesin  milliGRAM(s) Oral every 12 hours  gabapentin 600 milliGRAM(s) Oral three times a day  melatonin 5 milliGRAM(s) Oral at bedtime  apixaban 5 milliGRAM(s) Oral every 12 hours  exemestane 25 milliGRAM(s) Oral daily  fluticasone propionate 50 MICROgram(s)/spray Nasal Spray 1 Spray(s) Both Nostrils two times a day  sodium chloride 0.9%. 1000 milliLiter(s) IV Continuous <Continuous>    VITAL SIGNS:  T(C): 36.4 (06-03-25 @ 05:48), Max: 37.1 (06-02-25 @ 21:32)  HR: 107 (06-03-25 @ 07:06) (78 - 161)  BP: 103/56 (06-03-25 @ 07:06) (103/56 - 138/94)  RR: 17 (06-03-25 @ 07:06) (17 - 18)  SpO2: 96% (06-03-25 @ 07:06) (94% - 99%)  Wt(kg): --    I&O's Summary    02 Jun 2025 07:01  -  03 Jun 2025 07:00  --------------------------------------------------------  IN: 760 mL / OUT: 1053 mL / NET: -293 mL                            PHYSICAL EXAM:  Appearance: Normal	  HEENT: Normal oral mucosa, PERRL, EOMI	  Neck: Supple, + JVD/ - JVD; ___ Carotid Bruit   Cardiovascular: Normal S1 S2, No murmurs  Respiratory: Lungs clear to auscultation/Decreased Breath Sounds/No Rales, Rhonchi, Wheezing	  Gastrointestinal:  Soft, Non-tender, + BS	  Skin: No rashes, No ecchymoses, No cyanosis  Extremities: Normal range of motion, No clubbing, cyanosis or edema  Vascular: Peripheral pulses palpable 2+ bilaterally  Neurologic: Non-focal  Psychiatry: A & O x 3, Mood & affect appropriate    LABS:	 	             12.0   5.56  )-----------( 178      ( 03 Jun 2025 05:30 )             35.5     06-02    136  |  104  |  18  ----------------------------<  158[H]  4.1   |  20[L]  |  0.78    Ca    9.0      02 Jun 2025 15:39  Mg     1.8     06-02    TPro  7.0  /  Alb  3.2[L]  /  TBili  0.4  /  DBili  x   /  AST  20  /  ALT  25  /  AlkPhos  58  06-02    proBNP:   Lipid Profile:   HgA1c:   TSH: Thyroid Stimulating Hormone, Serum: 2.150 uIU/mL (06-02 @ 15:39)  PT/INR - ( 01 Jun 2025 10:20 )   PT: 15.2 sec;   INR: 1.31     PTT - ( 01 Jun 2025 10:20 )  PTT:27.0 sec Cardiology NP Adult Progress Note    SUBJECTIVE ASSESSMENT: Patient seen and examined at the bedside this morning. Patient reports feeling fatigued due to lack of PO intake. Patient denies diarrhea episodes during hospitalization. Patient denies chest pain, chest pressure, palpitations, N/V/D, fevers/chills, and SOB/ALDRIDGE.   	  MEDICATIONS:  metoprolol tartrate 50 milliGRAM(s) Oral three times a day  guaiFENesin  milliGRAM(s) Oral every 12 hours  gabapentin 600 milliGRAM(s) Oral three times a day  melatonin 5 milliGRAM(s) Oral at bedtime  apixaban 5 milliGRAM(s) Oral every 12 hours  exemestane 25 milliGRAM(s) Oral daily  fluticasone propionate 50 MICROgram(s)/spray Nasal Spray 1 Spray(s) Both Nostrils two times a day  sodium chloride 0.9%. 1000 milliLiter(s) IV Continuous <Continuous>    VITAL SIGNS:  T(C): 36.4 (06-03-25 @ 05:48), Max: 37.1 (06-02-25 @ 21:32)  HR: 107 (06-03-25 @ 07:06) (78 - 161)  BP: 103/56 (06-03-25 @ 07:06) (103/56 - 138/94)  RR: 17 (06-03-25 @ 07:06) (17 - 18)  SpO2: 96% (06-03-25 @ 07:06) (94% - 99%)  Wt(kg): --    I&O's Summary    02 Jun 2025 07:01  -  03 Jun 2025 07:00  --------------------------------------------------------  IN: 760 mL / OUT: 1053 mL / NET: -293 mL                            PHYSICAL EXAM:  Appearance: Normal	  HEENT: Normal oral mucosa, PERRL, EOMI	  Neck: Supple,- JVD   Cardiovascular: irregularly irregular, No murmurs  Respiratory: Lungs clear to auscultation/No Rales, Rhonchi, Wheezing	  Gastrointestinal:  Soft,  Mildly tender to umbilical region (chronic per patient), + BS	  Skin: No rashes, No ecchymoses, No cyanosis  Extremities: Normal range of motion, No clubbing, cyanosis or edema  Vascular: Peripheral pulses palpable 2+ bilaterally  Neurologic: Non-focal  Psychiatry: A & O x 3, Mood & affect appropriate    LABS:	 	             12.0   5.56  )-----------( 178      ( 03 Jun 2025 05:30 )             35.5     06-02    136  |  104  |  18  ----------------------------<  158[H]  4.1   |  20[L]  |  0.78    Ca    9.0      02 Jun 2025 15:39  Mg     1.8     06-02    TPro  7.0  /  Alb  3.2[L]  /  TBili  0.4  /  DBili  x   /  AST  20  /  ALT  25  /  AlkPhos  58  06-02  proBNP: 3383   Lipid Profile:   HgA1c: 5.5%   TSH: Thyroid Stimulating Hormone, Serum: 2.150 uIU/mL (06-02 @ 15:39)  PT/INR - ( 01 Jun 2025 10:20 )   PT: 15.2 sec;   INR: 1.31     PTT - ( 01 Jun 2025 10:20 )  PTT:27.0 sec

## 2025-06-03 NOTE — PROGRESS NOTE ADULT - PROBLEM SELECTOR PLAN 2
Pt reports GI illness x3-4 days, 3 episodes of soft stool during admission  - no episodes of diarrhea, just x3 soft stools.    - No WBC, afebrile   - contact isolation d/c'd.   - pt denies diarrhea during hospitalization.   - C/w probiotics QD
Pt reports GI illness x3-4 days, 3 episodes of soft stool during admission  - No WBC, afebrile  - F/u GI PCR and C. diff, on contact precautions while r/o  - C/w probiotics QD

## 2025-06-03 NOTE — DISCHARGE NOTE PROVIDER - NSDCFUSCHEDAPPT_GEN_ALL_CORE_FT
Dimitrios Soliz  [facility] Physician [facility]  HEARTVASC [address]  Scheduled Appointment: 07/14/2025     Kaela Shultz  [facility] Physician [facility]  CARDIOLOGY [address]  Scheduled Appointment: 06/12/2025    Dimitrios Soliz  [facility] Physician [facility]  HEARTVASC [address]  Scheduled Appointment: 07/14/2025

## 2025-06-03 NOTE — PROGRESS NOTE ADULT - PROBLEM SELECTOR PLAN 5
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

## 2025-06-03 NOTE — DISCHARGE NOTE PROVIDER - PROVIDER TOKENS
FREE:[LAST:[Lexii],FIRST:[Kaela],PHONE:[phone]],FAX:[(   )    -],ADDRESS:[[facility], 3rd Floor  [address] [place] [address], 2nd [place] [address]],SCHEDULEDAPPT:[06/12/2025],SCHEDULEDAPPTTIME:[10:00 AM]]

## 2025-06-03 NOTE — PROGRESS NOTE ADULT - PROBLEM SELECTOR PLAN 3
Pt presents with non-productive cough x several days, afebrile, no WBC, 100% on RA, lungs CTA b/l  - CXR (6/1/25): The heart and lungs are normal. There is a right-sided Mediport. Spinal fusion surgery.  - RVP (6/1/25): Negative  - C/w Mucinex 600mg PO BID  - Continue to monitor off antibiotics

## 2025-06-03 NOTE — DISCHARGE NOTE PROVIDER - ATTENDING DISCHARGE PHYSICAL EXAMINATION:
AF RVR  Symptomatic, normal echocardiogram s.p RUSS.  Eliquis, BB.    2. diarrhea  resolved. continue probiotics.

## 2025-06-03 NOTE — PROGRESS NOTE ADULT - NS ATTEND AMEND GEN_ALL_CORE FT
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 fib and blood clots, on Eliquis x3-4months following procedure but no longer taking, s/p IVC filter placement), presented to [facility] after pre-syncopal event this morning. Patient states that for the past 3-4 days she's been extremely weak, with no appetite, dizziness and poor PO intake. EKG on arrival revealed AFib RVR, infectious workup negative, CT PE negative, s/p 1L NS w/ improvement of sxs. Admitted to cardiology for further management of Afib RVR, starting Lopressor 12.5mg PO BID and Eliquis 5mg PO BID, continuing gentle hydration.    1. AF RVR  Symptomatic, HR jumped to 160S, IV amiodarone, DORIE/DCCV.  Normal echocardiogram.  Eliquis, BB.    2. diarrhea  resolved. continue probiotics.    3. CA mets  f.u heme onc recs    During non face-to-face time, I reviewed relevant portions of the patient’s medical record; including vitals, labs, medications, cardiac studies, remaining additional imaging and consultant recommendations. During face-to-face time, I took a relevant history and examined the patient. I also explained to the patient their diagnoses, and specific cardiopulmonary management plan, which required a high level of medical decision making.  I answered all questions related to the patient's medical conditions. I spent 60  minutes on total encounter; more than 50% of the visit was spent counseling and/or coordinating care by the attending physician, with plan of care discussed with the patient,  and cardiac care team.
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 fib and blood clots, on Eliquis x3-4months following procedure but no longer taking, s/p IVC filter placement), presented to [facility] after pre-syncopal event this morning. Patient states that for the past 3-4 days she's been extremely weak, with no appetite, dizziness and poor PO intake. EKG on arrival revealed AFib RVR, infectious workup negative, CT PE negative, s/p 1L NS w/ improvement of sxs. Admitted to cardiology for further management of Afib RVR, starting Lopressor 12.5mg PO BID and Eliquis 5mg PO BID, continuing gentle hydration.    1. AF RVR  Symptomatic, HR jumped to 160S, IV amiodarone, DORIE/DCCV.  Normal echocardiogram.  Eliquis, BB.    2. diarrhea  Immunocompromised. stool cultures, c diff isolation, probiotics.    3. CA mets  f.u heme onc recs    During non face-to-face time, I reviewed relevant portions of the patient’s medical record; including vitals, labs, medications, cardiac studies, remaining additional imaging and consultant recommendations. During face-to-face time, I took a relevant history and examined the patient. I also explained to the patient their diagnoses, and specific cardiopulmonary management plan, which required a high level of medical decision making.  I answered all questions related to the patient's medical conditions. I spent 60  minutes on total encounter; more than 50% of the visit was spent counseling and/or coordinating care by the attending physician, with plan of care discussed with the patient,  and cardiac care team.

## 2025-06-03 NOTE — PROGRESS NOTE ADULT - ASSESSMENT
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 fib and blood clots, on Eliquis x3-4months following procedure but no longer taking, s/p IVC filter placement), presented to [facility] after pre-syncopal event this morning. Patient states that for the past 3-4 days she's been extremely weak, with no appetite, dizziness and poor PO intake. EKG on arrival revealed AFib RVR, infectious workup negative, CT PE negative, s/p 1L NS w/ improvement of sxs. Admitted to cardiology for further management of Afib RVR, starting Lopressor 12.5mg PO BID and Eliquis 5mg PO BID, continuing gentle hydration. TTE EF 63%, no regional wall motion abnormalities with no significant valvular disease. Patient has no episodes of diarrhea during hospitalization. EP consulted for Afib, DORIE/DCCV, today, 6/4.

## 2025-06-03 NOTE — PROVIDER CONTACT NOTE (OTHER) - ASSESSMENT
BP: 103/56 (MAP 73)    O2: 96% on RA  Pt only complaining of dizziness. No SOB, diaphoresis, or blurry vision

## 2025-06-03 NOTE — DISCHARGE NOTE PROVIDER - NSDCFUADDAPPT_GEN_ALL_CORE_FT
Follow up with cardiologist in 1-2 weeks after discharge.   Follow up with electrophysiologist in 1-2 weeks after discharge

## 2025-06-03 NOTE — PROGRESS NOTE ADULT - PROBLEM SELECTOR PLAN 4
S/p L lumpectomy with multiple revisions, on hormonal modulator since 2021  - C/w Exemestane 25mg PO QD - confirmed dose w/ outpatient oncologist

## 2025-06-03 NOTE — DISCHARGE NOTE PROVIDER - TIME SPENT: (MINUTES SPENT ON THE DISCHARGE SERVICE)
Interdisciplinary Discharge Planning Note    Anticipated Discharge Date: couple days    Anticipated Discharge Location: home    Clinical Needs Before Discharge:   pain control, chest xray, surgery following, ambulate    Treatment Needs After Discharge:  None identified    Potential Barriers to Discharge:     Kaylie Martin  1/23/2025,  12:40 PM     EVITA Bang on 1/23/2025 at 12:52 PM     35 no Implemented All Fall with Harm Risk Interventions:  [place] to call system. Call bell, personal items and telephone within reach. Instruct patient to call for assistance. Room bathroom lighting operational. Non-slip footwear when patient is off stretcher. Physically safe environment: no spills, clutter or unnecessary equipment. Stretcher in lowest position, wheels locked, appropriate side rails in place. Provide visual cue, wrist band, yellow gown, etc. Monitor gait and stability. Monitor for mental status changes and reorient to person, place, and time. Review medications for side effects contributing to fall risk. Reinforce activity limits and safety measures with patient and family. Provide visual clues: red socks.

## 2025-06-03 NOTE — PROVIDER CONTACT NOTE (OTHER) - ACTION/TREATMENT ORDERED:
Pt settled back in bed and stated dizziness felt better. YAMIL Rico made aware - no further instructions at this time.

## 2025-06-03 NOTE — DISCHARGE NOTE PROVIDER - CARE PROVIDER_API CALL
Kaela Shultz  [facility], 3rd Floor  [address] [place] [address], 2nd & 3rd Floor, [place] [address]  Phone: [phone]  Fax: (   )    -  Scheduled Appointment: 06/12/2025 10:00 AM

## 2025-06-03 NOTE — DISCHARGE NOTE PROVIDER - NSDCCPCAREPLAN_GEN_ALL_CORE_FT
PRINCIPAL DISCHARGE DIAGNOSIS  Diagnosis: New onset atrial fibrillation  Assessment and Plan of Treatment: You have an abnormal heart rhythm (arrhythmia) called atrial fibrillation. With this condition, the hearts 2 upper chambers (the atria) quiver rather than squeeze the blood out in a normal pattern. This leads to an irregular and sometimes rapid heartbeat. Atrial fibrillation is serious condition as it affects the heart’s ability to fill with blood, and the blood can start to form clots.  These clots can travel to the brain through the blood vessels, and cause strokes.    -Please CONTINUE  ELIQUIS 5MG TWICE A DAY to prevent a stroke by helping to prevent clots from forming.   -Please CONTINUE _____ TWICE A DAY to keep your heart rate regular  -Please CONTINUE _____ DAILY to keep your heart in normal rhythm  -Please follow up with  ___.      SECONDARY DISCHARGE DIAGNOSES  Diagnosis: Near syncope  Assessment and Plan of Treatment:      PRINCIPAL DISCHARGE DIAGNOSIS  Diagnosis: New onset atrial fibrillation  Assessment and Plan of Treatment: You have an abnormal heart rhythm (arrhythmia) called atrial fibrillation. With this condition, the hearts 2 upper chambers (the atria) quiver rather than squeeze the blood out in a normal pattern. This leads to an irregular and sometimes rapid heartbeat. Atrial fibrillation is serious condition as it affects the heart’s ability to fill with blood, and the blood can start to form clots. These clots can travel to the brain through the blood vessels, and cause strokes.  You underwent a procedure which showed you did not have any clot in your heart and subsequently went for a Cardioversion ( Shock) which helped to restore your heart rate to a normal Rhythm    -Please CONTINUE  ELIQUIS 5MG TWICE A DAY to prevent a stroke by helping to prevent clots from forming.   -Please CONTINUE Toprol 25mg ONCE A DAY to keep your heart rate regular  -Please follow up with ____   And follow up with      SECONDARY DISCHARGE DIAGNOSES  Diagnosis: Gallbladder cancer  Assessment and Plan of Treatment: Continue to follow up with your outpatient Oncologist for continued management and treatment    Diagnosis: Breast cancer  Assessment and Plan of Treatment: Continue to follow up with your Oncologist for treatement and management     PRINCIPAL DISCHARGE DIAGNOSIS  Diagnosis: New onset atrial fibrillation  Assessment and Plan of Treatment: You have an abnormal heart rhythm (arrhythmia) called atrial fibrillation. With this condition, the hearts 2 upper chambers (the atria) quiver rather than squeeze the blood out in a normal pattern. This leads to an irregular and sometimes rapid heartbeat. Atrial fibrillation is serious condition as it affects the heart’s ability to fill with blood, and the blood can start to form clots. These clots can travel to the brain through the blood vessels, and cause strokes.  You underwent a procedure which showed you did not have any clot in your heart and subsequently went for a Cardioversion (Shock) which helped to restore your heart rate to a normal Rhythm    -Please CONTINUE  ELIQUIS 5MG TWICE A DAY to prevent a stroke by helping to prevent clots from forming.   -Please CONTINUE Toprol 25mg ONCE A DAY to keep your heart rate regular  -Please follow up with ____         SECONDARY DISCHARGE DIAGNOSES  Diagnosis: Gallbladder cancer  Assessment and Plan of Treatment: Continue to follow up with your outpatient Oncologist for continued management and treatment    Diagnosis: Breast cancer  Assessment and Plan of Treatment: Continue to follow up with your Oncologist for treatement and management

## 2025-06-03 NOTE — DISCHARGE NOTE PROVIDER - NSDCMRMEDTOKEN_GEN_ALL_CORE_FT
exemestane 25 mg oral tablet: 1 tab(s) orally once a day  fluticasone 50 mcg/inh nasal spray: 1 spray(s) in each nostril once a day  gabapentin 600 mg oral tablet: 1 tab(s) orally 3 times a day  ondansetron 4 mg oral tablet: 1 tab(s) orally prn as needed for  nausea   Eliquis 5 mg oral tablet: 1 tab(s) orally 2 times a day  exemestane 25 mg oral tablet: 1 tab(s) orally once a day  fluticasone 50 mcg/inh nasal spray: 1 spray(s) in each nostril once a day  gabapentin 600 mg oral tablet: 1 tab(s) orally 3 times a day  ondansetron 4 mg oral tablet: 1 tab(s) orally prn as needed for  nausea   apixaban 5 mg oral tablet: 1 tab(s) orally every 12 hours  exemestane 25 mg oral tablet: 1 tab(s) orally once a day  fluticasone 50 mcg/inh nasal spray: 1 spray(s) in each nostril once a day  gabapentin 600 mg oral tablet: 1 tab(s) orally 3 times a day  ondansetron 4 mg oral tablet: 1 tab(s) orally prn as needed for  nausea  Toprol-XL 25 mg oral tablet, extended release: 1 tab(s) orally once a day   apixaban 5 mg oral tablet: 1 tab(s) orally every 12 hours  Eliquis 5 mg oral tablet: 1 tab(s) orally 2 times a day  exemestane 25 mg oral tablet: 1 tab(s) orally once a day  fluticasone 50 mcg/inh nasal spray: 1 spray(s) in each nostril once a day  gabapentin 600 mg oral tablet: 1 tab(s) orally 3 times a day  ondansetron 4 mg oral tablet: 1 tab(s) orally prn as needed for  nausea  Toprol-XL 25 mg oral tablet, extended release: 1 tab(s) orally once a day

## 2025-06-03 NOTE — DISCHARGE NOTE PROVIDER - HOSPITAL COURSE
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 fib and blood clots, on Eliquis x3-4months following procedure but no longer taking, s/p IVC filter placement), presented to [facility] after pre-syncopal event this morning. Patient states that for the past 3-4 days she's been extremely weak, with no appetite, dizziness and poor PO intake. Additionally reports non-productive cough x several days, endorses subjective fever at the start of her symptoms, however has been afebrile in hospital. States she developed watery diarrhea this morning. While on toilet, she almost passed out, witnessed by her sister who states she did not fully lose consciousness and did not hit her head. Reports this has happened once prior where she passed out from low BP. Additionally reports constant L sided chest pressure since her breast revision, noted it was more prominent today. Additionally had an episode of palpitations overnight. Denies history of arrhythmia/atrial fibrillation or any known cardiac disease, currently denies shortness of breath/palpitations/chest pain, denies urinary symptoms, denies bleeding, denies exposure to sick contacts.    In ED:  Vitals: T 98.3, HR 125bpm, /63mmHg, RR 15, SpO2 97% RA  Significant labs: WBC 8.41, Hgb 13.1, Hct 39.6, Plt 143, Na 136, K 3.0, Cl 106, CO2 22, BUN 22, Cr 0.83, BNP 3383, Lactate 1.6, Trop I 20.7  RVP and UA negative  EKG: Afib RVR 129bpm, non-ischemic  CXR: The heart and lungs are normal. There is a right-sided Mediport. Spinal fusion surgery.  CT PE: no pulmonary emboli  Interventions: Eliquis 5mg PO x1, NS 1L, Tylenol 975mg x1, Gabapentin 600mg PO x1, Zofran 4mg IVP x1    Admitted to cardiology for further management of Afib RVR.    TTE 6/3: Left ventricular systolic function is normal. Normal right ventricular systolic function. Biatrial dilation. No thrombus seen in the left atrium, left atrial appendage, right   atrium. or right atrial appendage. Mild tricuspid regurgitation.    Plan for DORIE/DCCV 6/3/2025 _______    Pt seen and examined at bedside this AM without any complaints or events overnight, VSS, labs and telemetry reviewed and pt stable for discharge as discussed with  ___. Pt has received appropriate discharge instructions, including medication regimen, access site management and follow up with  __ in 1-2 weeks.    Discharge medications: ASA 81mg QD, Plavix 75mg QD, ______________.    Pt discharge copies detail cardiovascular history, medications, testing/treatments, OR patient has created a portal account and instructed to provide their records at their 1st appointment.    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 fib and blood clots, on Eliquis x3-4months following procedure but no longer taking, s/p IVC filter placement), presented to [facility] after pre-syncopal event this morning. Patient states that for the past 3-4 days she's been extremely weak, with no appetite, dizziness and poor PO intake. Additionally reports non-productive cough x several days, endorses subjective fever at the start of her symptoms, however has been afebrile in hospital. States she developed watery diarrhea this morning. While on toilet, she almost passed out, witnessed by her sister who states she did not fully lose consciousness and did not hit her head. Reports this has happened once prior where she passed out from low BP. Additionally reports constant L sided chest pressure since her breast revision, noted it was more prominent today. Additionally had an episode of palpitations overnight. Denies history of arrhythmia/atrial fibrillation or any known cardiac disease, currently denies shortness of breath/palpitations/chest pain, denies urinary symptoms, denies bleeding, denies exposure to sick contacts.    In ED:  Vitals: T 98.3, HR 125bpm, /63mmHg, RR 15, SpO2 97% RA  Significant labs: WBC 8.41, Hgb 13.1, Hct 39.6, Plt 143, Na 136, K 3.0, Cl 106, CO2 22, BUN 22, Cr 0.83, BNP 3383, Lactate 1.6, Trop I 20.7  RVP and UA negative  EKG: Afib RVR 129bpm, non-ischemic  CXR: The heart and lungs are normal. There is a right-sided Mediport. Spinal fusion surgery.  CT PE: no pulmonary emboli  Interventions: Eliquis 5mg PO x1, NS 1L, Tylenol 975mg x1, Gabapentin 600mg PO x1, Zofran 4mg IVP x1    Admitted to cardiology for further management of Afib RVR.    TTE 6/3: Left ventricular systolic function is normal. Normal right ventricular systolic function. Biatrial dilation. No thrombus seen in the left atrium, left atrial appendage, right   atrium. or right atrial appendage. Mild tricuspid regurgitation.    Plan for DORIE/DCCV 6/3/2025:    1. Left ventricular systolic function is normal.   2. Normal right ventricular systolic function.   3. Biatrial dilation.   4. No thrombus seen in the left atrium, left atrial appendage, right   atrium. or right atrial appendage.   5. Catheter noted in the right atrium.   6. Mild tricuspid regurgitation.   7. No echocardiographic evidence of pulmonary hypertension.   8. No pericardial effusion seen.     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 fib and blood clots, on Eliquis x3-4months following procedure but no longer taking, s/p IVC filter placement), presented to [facility] after pre-syncopal event this morning. Patient states that for the past 3-4 days she's been extremely weak, with no appetite, dizziness and poor PO intake. Additionally reports non-productive cough x several days, endorses subjective fever at the start of her symptoms, however has been afebrile in hospital. States she developed watery diarrhea this morning. While on toilet, she almost passed out, witnessed by her sister who states she did not fully lose consciousness and did not hit her head. Reports this has happened once prior where she passed out from low BP. Additionally reports constant L sided chest pressure since her breast revision, noted it was more prominent today. Additionally had an episode of palpitations overnight. Denies history of arrhythmia/atrial fibrillation or any known cardiac disease, currently denies shortness of breath/palpitations/chest pain, denies urinary symptoms, denies bleeding, denies exposure to sick contacts.    In ED:  Vitals: T 98.3, HR 125bpm, /63mmHg, RR 15, SpO2 97% RA  Significant labs: WBC 8.41, Hgb 13.1, Hct 39.6, Plt 143, Na 136, K 3.0, Cl 106, CO2 22, BUN 22, Cr 0.83, BNP 3383, Lactate 1.6, Trop I 20.7  RVP and UA negative  EKG: Afib RVR 129bpm, non-ischemic  CXR: The heart and lungs are normal. There is a right-sided Mediport. Spinal fusion surgery.  CT PE: no pulmonary emboli  Interventions: Eliquis 5mg PO x1, NS 1L, Tylenol 975mg x1, Gabapentin 600mg PO x1, Zofran 4mg IVP x1    Admitted to cardiology for further management of Afib RVR.    TTE 6/3: Left ventricular systolic function is normal. Normal right ventricular systolic function. Biatrial dilation. No thrombus seen in the left atrium, left atrial appendage, right   atrium. or right atrial appendage. Mild tricuspid regurgitation.    Plan for DORIE/DCCV 6/3/2025:    1. Left ventricular systolic function is normal.   2. Normal right ventricular systolic function.   3. Biatrial dilation.   4. No thrombus seen in the left atrium, left atrial appendage, right   atrium. or right atrial appendage.   5. Catheter noted in the right atrium.   6. Mild tricuspid regurgitation.   7. No echocardiographic evidence of pulmonary hypertension.   8. No pericardial effusion seen.     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 fib and blood clots, on Eliquis x3-4months following procedure but no longer taking, s/p IVC filter placement), presented to [facility] after pre-syncopal event this morning. Patient states that for the past 3-4 days she's been extremely weak, with no appetite, dizziness and poor PO intake. Additionally reports non-productive cough x several days, endorses subjective fever at the start of her symptoms, however has been afebrile in hospital. States she developed watery diarrhea this morning. While on toilet, she almost passed out, witnessed by her sister who states she did not fully lose consciousness and did not hit her head. Reports this has happened once prior where she passed out from low BP. Additionally reports constant L sided chest pressure since her breast revision, noted it was more prominent today. Additionally had an episode of palpitations overnight. Denies history of arrhythmia/atrial fibrillation or any known cardiac disease, currently denies shortness of breath/palpitations/chest pain, denies urinary symptoms, denies bleeding, denies exposure to sick contacts.    In ED:  Vitals: T 98.3, HR 125bpm, /63mmHg, RR 15, SpO2 97% RA  Significant labs: WBC 8.41, Hgb 13.1, Hct 39.6, Plt 143, Na 136, K 3.0, Cl 106, CO2 22, BUN 22, Cr 0.83, BNP 3383, Lactate 1.6, Trop I 20.7  RVP and UA negative  EKG: Afib RVR 129bpm, non-ischemic  CXR: The heart and lungs are normal. There is a right-sided Mediport. Spinal fusion surgery.  CT PE: no pulmonary emboli  Interventions: Eliquis 5mg PO x1, NS 1L, Tylenol 975mg x1, Gabapentin 600mg PO x1, Zofran 4mg IVP x1    Admitted to cardiology for further management of Afib RVR.    TTE 6/3: Left ventricular systolic function is normal. Normal right ventricular systolic function. Biatrial dilation. No thrombus seen in the left atrium, left atrial appendage, right   atrium. or right atrial appendage. Mild tricuspid regurgitation.    Plan for DORIE/DCCV 6/3/2025:    1. Left ventricular systolic function is normal.   2. Normal right ventricular systolic function.   3. Biatrial dilation.   4. No thrombus seen in the left atrium, left atrial appendage, right   atrium. or right atrial appendage.   5. Catheter noted in the right atrium.   6. Mild tricuspid regurgitation.   7. No echocardiographic evidence of pulmonary hypertension.   8. No pericardial effusion seen.     Eliquis price checked on Discharge and cost 0$     Patient discharged home and will follow up with Dr. Shultz on 06/12 @ 10:00am

## 2025-06-03 NOTE — PROGRESS NOTE ADULT - PROBLEM SELECTOR PLAN 1
Presented w/ globalized weakness, HR 90-140s  - EKG: Afib RVR 129bpm, non-ischemic  - H/o A. fib 1x after spinal surgery in 2023  - Infectious workup negative  - S/p 1L NS with improvement of HR to low 100s, c/w gentle hydration  - Rate: increase to Lopressor 50mg PO Q8h  - AC: Eliquis 5mg PO BID  - TTE (6/2/25): LVEF 63%. No RWMA seen. Normal RV size, with normal wall thickness, and normal RVEF. Normal LA/RA size. No significant valvular disease. Estimated PASP is 24 mmHg, c/w normal pulmonary artery pressure. No pericardial effusion seen.  - TFTs (WNL) and iron studies w/ elevated ferritin and decreased transferrin  - EP consulted, DORIE/DCCV today, 6/4.    **Of note, pt w/ episode of -160s while sitting up to eat lunch 6/2, developed blurry vision, dizziness, 6/10 CP, , EKG unchanged, trop negative, symptoms improved when pt laid back down in bed, s/p Amio 150mg IVPx1,  HR maintained in afib 110s

## 2025-06-04 ENCOUNTER — TRANSCRIPTION ENCOUNTER (OUTPATIENT)
Age: 75
End: 2025-06-04

## 2025-06-04 VITALS — RESPIRATION RATE: 16 BRPM | HEART RATE: 63 BPM | SYSTOLIC BLOOD PRESSURE: 112 MMHG | DIASTOLIC BLOOD PRESSURE: 53 MMHG

## 2025-06-04 PROBLEM — C23 MALIGNANT NEOPLASM OF GALLBLADDER: Chronic | Status: ACTIVE | Noted: 2025-06-01

## 2025-06-04 PROBLEM — C50.919 MALIGNANT NEOPLASM OF UNSPECIFIED SITE OF UNSPECIFIED FEMALE BREAST: Chronic | Status: ACTIVE | Noted: 2025-06-01

## 2025-06-04 PROBLEM — M41.9 SCOLIOSIS, UNSPECIFIED: Chronic | Status: ACTIVE | Noted: 2025-06-01

## 2025-06-04 LAB
ALBUMIN SERPL ELPH-MCNC: 3 G/DL — LOW (ref 3.3–5)
ALP SERPL-CCNC: 56 U/L — SIGNIFICANT CHANGE UP (ref 40–120)
ALT FLD-CCNC: 21 U/L — SIGNIFICANT CHANGE UP (ref 10–45)
ANION GAP SERPL CALC-SCNC: 10 MMOL/L — SIGNIFICANT CHANGE UP (ref 5–17)
AST SERPL-CCNC: 20 U/L — SIGNIFICANT CHANGE UP (ref 10–40)
BASOPHILS # BLD AUTO: 0.03 K/UL — SIGNIFICANT CHANGE UP (ref 0–0.2)
BASOPHILS NFR BLD AUTO: 0.5 % — SIGNIFICANT CHANGE UP (ref 0–2)
BILIRUB SERPL-MCNC: 0.4 MG/DL — SIGNIFICANT CHANGE UP (ref 0.2–1.2)
BUN SERPL-MCNC: 16 MG/DL — SIGNIFICANT CHANGE UP (ref 7–23)
CALCIUM SERPL-MCNC: 9 MG/DL — SIGNIFICANT CHANGE UP (ref 8.4–10.5)
CHLORIDE SERPL-SCNC: 106 MMOL/L — SIGNIFICANT CHANGE UP (ref 96–108)
CO2 SERPL-SCNC: 22 MMOL/L — SIGNIFICANT CHANGE UP (ref 22–31)
CREAT SERPL-MCNC: 0.8 MG/DL — SIGNIFICANT CHANGE UP (ref 0.5–1.3)
EGFR: 76 ML/MIN/1.73M2 — SIGNIFICANT CHANGE UP
EGFR: 76 ML/MIN/1.73M2 — SIGNIFICANT CHANGE UP
EOSINOPHIL # BLD AUTO: 0.11 K/UL — SIGNIFICANT CHANGE UP (ref 0–0.5)
EOSINOPHIL NFR BLD AUTO: 1.7 % — SIGNIFICANT CHANGE UP (ref 0–6)
GLUCOSE SERPL-MCNC: 87 MG/DL — SIGNIFICANT CHANGE UP (ref 70–99)
HCT VFR BLD CALC: 32.2 % — LOW (ref 34.5–45)
HGB BLD-MCNC: 10.5 G/DL — LOW (ref 11.5–15.5)
IMM GRANULOCYTES NFR BLD AUTO: 0.3 % — SIGNIFICANT CHANGE UP (ref 0–0.9)
LYMPHOCYTES # BLD AUTO: 1.45 K/UL — SIGNIFICANT CHANGE UP (ref 1–3.3)
LYMPHOCYTES # BLD AUTO: 22.8 % — SIGNIFICANT CHANGE UP (ref 13–44)
MAGNESIUM SERPL-MCNC: 1.7 MG/DL — SIGNIFICANT CHANGE UP (ref 1.6–2.6)
MCHC RBC-ENTMCNC: 29.7 PG — SIGNIFICANT CHANGE UP (ref 27–34)
MCHC RBC-ENTMCNC: 32.6 G/DL — SIGNIFICANT CHANGE UP (ref 32–36)
MCV RBC AUTO: 91 FL — SIGNIFICANT CHANGE UP (ref 80–100)
MONOCYTES # BLD AUTO: 0.87 K/UL — SIGNIFICANT CHANGE UP (ref 0–0.9)
MONOCYTES NFR BLD AUTO: 13.7 % — SIGNIFICANT CHANGE UP (ref 2–14)
NEUTROPHILS # BLD AUTO: 3.87 K/UL — SIGNIFICANT CHANGE UP (ref 1.8–7.4)
NEUTROPHILS NFR BLD AUTO: 61 % — SIGNIFICANT CHANGE UP (ref 43–77)
NRBC BLD AUTO-RTO: 0 /100 WBCS — SIGNIFICANT CHANGE UP (ref 0–0)
PLATELET # BLD AUTO: 157 K/UL — SIGNIFICANT CHANGE UP (ref 150–400)
POTASSIUM SERPL-MCNC: 4.1 MMOL/L — SIGNIFICANT CHANGE UP (ref 3.5–5.3)
POTASSIUM SERPL-SCNC: 4.1 MMOL/L — SIGNIFICANT CHANGE UP (ref 3.5–5.3)
PROT SERPL-MCNC: 6.7 G/DL — SIGNIFICANT CHANGE UP (ref 6–8.3)
RBC # BLD: 3.54 M/UL — LOW (ref 3.8–5.2)
RBC # FLD: 14.6 % — HIGH (ref 10.3–14.5)
SODIUM SERPL-SCNC: 138 MMOL/L — SIGNIFICANT CHANGE UP (ref 135–145)
WBC # BLD: 6.35 K/UL — SIGNIFICANT CHANGE UP (ref 3.8–10.5)
WBC # FLD AUTO: 6.35 K/UL — SIGNIFICANT CHANGE UP (ref 3.8–10.5)

## 2025-06-04 PROCEDURE — C8925: CPT

## 2025-06-04 PROCEDURE — 93005 ELECTROCARDIOGRAM TRACING: CPT

## 2025-06-04 PROCEDURE — 85610 PROTHROMBIN TIME: CPT

## 2025-06-04 PROCEDURE — 82962 GLUCOSE BLOOD TEST: CPT

## 2025-06-04 PROCEDURE — 94640 AIRWAY INHALATION TREATMENT: CPT

## 2025-06-04 PROCEDURE — 84436 ASSAY OF TOTAL THYROXINE: CPT

## 2025-06-04 PROCEDURE — 93306 TTE W/DOPPLER COMPLETE: CPT

## 2025-06-04 PROCEDURE — 71045 X-RAY EXAM CHEST 1 VIEW: CPT

## 2025-06-04 PROCEDURE — 87040 BLOOD CULTURE FOR BACTERIA: CPT

## 2025-06-04 PROCEDURE — 84480 ASSAY TRIIODOTHYRONINE (T3): CPT

## 2025-06-04 PROCEDURE — 99285 EMERGENCY DEPT VISIT HI MDM: CPT | Mod: 25

## 2025-06-04 PROCEDURE — 84484 ASSAY OF TROPONIN QUANT: CPT

## 2025-06-04 PROCEDURE — 83880 ASSAY OF NATRIURETIC PEPTIDE: CPT

## 2025-06-04 PROCEDURE — 83540 ASSAY OF IRON: CPT

## 2025-06-04 PROCEDURE — 83735 ASSAY OF MAGNESIUM: CPT

## 2025-06-04 PROCEDURE — 99239 HOSP IP/OBS DSCHRG MGMT >30: CPT

## 2025-06-04 PROCEDURE — 82746 ASSAY OF FOLIC ACID SERUM: CPT

## 2025-06-04 PROCEDURE — 85025 COMPLETE CBC W/AUTO DIFF WBC: CPT

## 2025-06-04 PROCEDURE — 71275 CT ANGIOGRAPHY CHEST: CPT

## 2025-06-04 PROCEDURE — 80048 BASIC METABOLIC PNL TOTAL CA: CPT

## 2025-06-04 PROCEDURE — 83036 HEMOGLOBIN GLYCOSYLATED A1C: CPT

## 2025-06-04 PROCEDURE — 82728 ASSAY OF FERRITIN: CPT

## 2025-06-04 PROCEDURE — 96374 THER/PROPH/DIAG INJ IV PUSH: CPT

## 2025-06-04 PROCEDURE — 83550 IRON BINDING TEST: CPT

## 2025-06-04 PROCEDURE — 0241U: CPT

## 2025-06-04 PROCEDURE — 81001 URINALYSIS AUTO W/SCOPE: CPT

## 2025-06-04 PROCEDURE — 83605 ASSAY OF LACTIC ACID: CPT

## 2025-06-04 PROCEDURE — 85730 THROMBOPLASTIN TIME PARTIAL: CPT

## 2025-06-04 PROCEDURE — 80061 LIPID PANEL: CPT

## 2025-06-04 PROCEDURE — 80053 COMPREHEN METABOLIC PANEL: CPT

## 2025-06-04 PROCEDURE — 87637 SARSCOV2&INF A&B&RSV AMP PRB: CPT

## 2025-06-04 PROCEDURE — 84443 ASSAY THYROID STIM HORMONE: CPT

## 2025-06-04 PROCEDURE — 82607 VITAMIN B-12: CPT

## 2025-06-04 PROCEDURE — 85027 COMPLETE CBC AUTOMATED: CPT

## 2025-06-04 PROCEDURE — 36415 COLL VENOUS BLD VENIPUNCTURE: CPT

## 2025-06-04 PROCEDURE — 84466 ASSAY OF TRANSFERRIN: CPT

## 2025-06-04 RX ORDER — APIXABAN 2.5 MG/1
1 TABLET, FILM COATED ORAL
Qty: 60 | Refills: 11
Start: 2025-06-04 | End: 2026-05-29

## 2025-06-04 RX ORDER — APIXABAN 2.5 MG/1
1 TABLET, FILM COATED ORAL
Qty: 60 | Refills: 0
Start: 2025-06-04 | End: 2025-07-03

## 2025-06-04 RX ORDER — APIXABAN 2.5 MG/1
1 TABLET, FILM COATED ORAL
Qty: 0 | Refills: 0 | DISCHARGE
Start: 2025-06-04

## 2025-06-04 RX ORDER — MAGNESIUM OXIDE 400 MG
800 TABLET ORAL ONCE
Refills: 0 | Status: COMPLETED | OUTPATIENT
Start: 2025-06-04 | End: 2025-06-04

## 2025-06-04 RX ORDER — METOPROLOL SUCCINATE 50 MG/1
1 TABLET, EXTENDED RELEASE ORAL
Qty: 30 | Refills: 6
Start: 2025-06-04 | End: 2025-12-30

## 2025-06-04 RX ADMIN — METOPROLOL SUCCINATE 50 MILLIGRAM(S): 50 TABLET, EXTENDED RELEASE ORAL at 07:05

## 2025-06-04 RX ADMIN — METOPROLOL SUCCINATE 50 MILLIGRAM(S): 50 TABLET, EXTENDED RELEASE ORAL at 13:04

## 2025-06-04 RX ADMIN — Medication 1 LOZENGE: at 01:12

## 2025-06-04 RX ADMIN — FLUTICASONE PROPIONATE 1 SPRAY(S): 50 SPRAY, METERED NASAL at 06:37

## 2025-06-04 RX ADMIN — Medication 1 TABLET(S): at 12:59

## 2025-06-04 RX ADMIN — GABAPENTIN 600 MILLIGRAM(S): 400 CAPSULE ORAL at 13:04

## 2025-06-04 RX ADMIN — APIXABAN 5 MILLIGRAM(S): 2.5 TABLET, FILM COATED ORAL at 01:12

## 2025-06-04 RX ADMIN — APIXABAN 5 MILLIGRAM(S): 2.5 TABLET, FILM COATED ORAL at 13:04

## 2025-06-04 RX ADMIN — GABAPENTIN 600 MILLIGRAM(S): 400 CAPSULE ORAL at 06:37

## 2025-06-04 RX ADMIN — Medication 800 MILLIGRAM(S): at 10:10

## 2025-06-04 RX ADMIN — EXEMESTANE 25 MILLIGRAM(S): 25 TABLET, FILM COATED ORAL at 13:08

## 2025-06-04 RX ADMIN — DEXTROMETHORPHAN HBR, GUAIFENESIN 600 MILLIGRAM(S): 200 LIQUID ORAL at 06:37

## 2025-06-04 NOTE — DISCHARGE NOTE NURSING/CASE MANAGEMENT/SOCIAL WORK - MODE OF TRANSPORTATION
Detail Level: Detailed Quality 110: Preventive Care And Screening: Influenza Immunization: Influenza Immunization Administered during Influenza season Medicaid Transportation

## 2025-06-04 NOTE — DISCHARGE NOTE NURSING/CASE MANAGEMENT/SOCIAL WORK - NSDCPEFALRISK_GEN_ALL_CORE
For information on Fall & Injury Prevention, visit: [email or web address] OR  [email or web address] OR  https://www.cdc.gov/steadi/patient.html

## 2025-06-04 NOTE — DISCHARGE NOTE NURSING/CASE MANAGEMENT/SOCIAL WORK - PATIENT PORTAL LINK FT
You can access the FollowMyHealth Patient Portal offered by [facility] by registering at the following website: [email or web address]. By joining [facility]’s FollowMyHealth portal, you will also be able to view your health information using other applications (apps) compatible with our system.

## 2025-06-04 NOTE — DISCHARGE NOTE NURSING/CASE MANAGEMENT/SOCIAL WORK - FINANCIAL ASSISTANCE
[facility] provides services at a reduced cost to those who are determined to be eligible through [facility]’s financial assistance program. Information regarding [facility]’s financial assistance program can be found by going to [email or web address] or by calling [phone].

## 2025-06-06 LAB
CULTURE RESULTS: SIGNIFICANT CHANGE UP
CULTURE RESULTS: SIGNIFICANT CHANGE UP
SPECIMEN SOURCE: SIGNIFICANT CHANGE UP
SPECIMEN SOURCE: SIGNIFICANT CHANGE UP

## 2025-06-07 DIAGNOSIS — C78.89 SECONDARY MALIGNANT NEOPLASM OF OTHER DIGESTIVE ORGANS: ICD-10-CM

## 2025-06-07 DIAGNOSIS — Z79.69 LONG TERM (CURRENT) USE OF OTHER IMMUNOMODULATORS AND IMMUNOSUPPRESSANTS: ICD-10-CM

## 2025-06-07 DIAGNOSIS — Z85.3 PERSONAL HISTORY OF MALIGNANT NEOPLASM OF BREAST: ICD-10-CM

## 2025-06-07 DIAGNOSIS — I36.1 NONRHEUMATIC TRICUSPID (VALVE) INSUFFICIENCY: ICD-10-CM

## 2025-06-07 DIAGNOSIS — Z87.891 PERSONAL HISTORY OF NICOTINE DEPENDENCE: ICD-10-CM

## 2025-06-07 DIAGNOSIS — Z98.1 ARTHRODESIS STATUS: ICD-10-CM

## 2025-06-07 DIAGNOSIS — Z85.09 PERSONAL HISTORY OF MALIGNANT NEOPLASM OF OTHER DIGESTIVE ORGANS: ICD-10-CM

## 2025-06-07 DIAGNOSIS — Z79.01 LONG TERM (CURRENT) USE OF ANTICOAGULANTS: ICD-10-CM

## 2025-06-07 DIAGNOSIS — R05.9 COUGH, UNSPECIFIED: ICD-10-CM

## 2025-06-07 DIAGNOSIS — Z90.49 ACQUIRED ABSENCE OF OTHER SPECIFIED PARTS OF DIGESTIVE TRACT: ICD-10-CM

## 2025-06-07 DIAGNOSIS — I48.19 OTHER PERSISTENT ATRIAL FIBRILLATION: ICD-10-CM

## 2025-06-07 DIAGNOSIS — Z92.21 PERSONAL HISTORY OF ANTINEOPLASTIC CHEMOTHERAPY: ICD-10-CM

## 2025-06-07 DIAGNOSIS — F12.90 CANNABIS USE, UNSPECIFIED, UNCOMPLICATED: ICD-10-CM

## 2025-06-07 DIAGNOSIS — E87.6 HYPOKALEMIA: ICD-10-CM

## 2025-06-10 ENCOUNTER — NON-APPOINTMENT (OUTPATIENT)
Age: 75
End: 2025-06-10

## 2025-06-12 ENCOUNTER — APPOINTMENT (OUTPATIENT)
Dept: CARDIOLOGY | Facility: CLINIC | Age: 75
End: 2025-06-12

## 2025-07-14 ENCOUNTER — APPOINTMENT (OUTPATIENT)
Dept: HEART AND VASCULAR | Facility: CLINIC | Age: 75
End: 2025-07-14